# Patient Record
Sex: FEMALE | Race: WHITE | Employment: FULL TIME | URBAN - METROPOLITAN AREA
[De-identification: names, ages, dates, MRNs, and addresses within clinical notes are randomized per-mention and may not be internally consistent; named-entity substitution may affect disease eponyms.]

---

## 2017-11-09 ENCOUNTER — OFFICE VISIT (OUTPATIENT)
Dept: MEDICAL GROUP | Facility: MEDICAL CENTER | Age: 47
End: 2017-11-09
Payer: COMMERCIAL

## 2017-11-09 DIAGNOSIS — Z17.0 MALIGNANT NEOPLASM OF LEFT BREAST IN FEMALE, ESTROGEN RECEPTOR POSITIVE, UNSPECIFIED SITE OF BREAST (HCC): ICD-10-CM

## 2017-11-09 DIAGNOSIS — C50.912 MALIGNANT NEOPLASM OF LEFT BREAST IN FEMALE, ESTROGEN RECEPTOR POSITIVE, UNSPECIFIED SITE OF BREAST (HCC): ICD-10-CM

## 2017-11-09 DIAGNOSIS — M25.50 ARTHRALGIA, UNSPECIFIED JOINT: ICD-10-CM

## 2017-11-09 DIAGNOSIS — Z82.61 FAMILY HISTORY OF RHEUMATOID ARTHRITIS: ICD-10-CM

## 2017-11-09 PROBLEM — Z90.13 S/P MASTECTOMY, BILATERAL: Status: ACTIVE | Noted: 2017-10-16

## 2017-11-09 PROCEDURE — 99214 OFFICE O/P EST MOD 30 MIN: CPT | Performed by: FAMILY MEDICINE

## 2017-11-09 RX ORDER — TAMOXIFEN CITRATE 20 MG/1
20 TABLET ORAL 2 TIMES DAILY
COMMUNITY
End: 2018-12-20

## 2017-11-09 RX ORDER — GABAPENTIN 100 MG/1
100 CAPSULE ORAL 3 TIMES DAILY
COMMUNITY
End: 2018-07-11

## 2017-11-09 ASSESSMENT — PATIENT HEALTH QUESTIONNAIRE - PHQ9: CLINICAL INTERPRETATION OF PHQ2 SCORE: 0

## 2017-11-09 NOTE — ASSESSMENT & PLAN NOTE
The patient is being followed at [facility] for this. She is scheduled to see them next month. She currently has expanders in place and will be scheduled for reconstruction in the next several months. 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 LEFT 3:30, 5 cm FN: 1.8cm irregular spiculated mass with overlying skin retraction.  LEFT 3:30, 2.5 cm FN: 1.3cm irregular spiculated mass. LEFT inner retroareolar: Small benign appearing oval circumscribed masses measuring up to 1.3 cm. RIGHT inner retroareolar: Multiple oval circumscribed masses measuring up to 3cm.  -04/26/17: Bilateral US (converted from screening to diagnostic):  LEFT 3:30, 5cm FN: 1.8 x 1.1 x 0.9cm irregular spiculated hypoechoic mass, highly suspicious. LEFT 3:30, 2.5cm FN: 0.8 x 0.9 x 1.3cm irregular spiculated mass, highly suspicious. Few small cysts. No left axillary adenopathy. RIGHT 11:00, 4cm FN: 0.9 x 0.6 x 0.5cm oval mass with microlobulated margins (originally better seen on ABUS but confirmed on focused US). RIGHT inner retroareolar additional benign appearing oval circumscribed masses including cysts measuring up to 3cm.  Pathology:  LEFT 3:30, 5cm FN (1.8cm mass): IDC. Grade 2. No LVI. l0 mm on core. ER 61-70%. RI negative. Ki-6715-20%. Her-2 negative by FISH  HER2 negative by FISH.  LEFT 3:30, 2.5cm FN (1.3cm mass): IDC. Grade 3. No LVI. 15 mm on core. ER 41-50%. RI 1-10%. Ki-67 30-40%.  HER2 negative by FISH. +LCIS focally.  RIGHT 11:00. 4cm FN (0.9cm mass): Sclerosing adenosis, negative for CA. Felt to be concordant.  -05/13/17: MRI breast - LEFT posterior lateral mass measures 2.4 x 2.5 x 2.9cm, abuts but does not invade the pectoralis major. Extends to within 2mm of overlying skin which is abnormally thickened and retracted, though no abnormal skin enhancement. LEFT anterior-medial smaller mass measures 1.6 x 1.3x 1.5cm. Highly  suspicious lower outer (3:30-5:30) segmental nonmass enhancement inferior, medial and between both masses spanning a region 3.6 x 4.2 x 3cm. Few small inner left breast cysts. No axillary adenopathy. RIGHT breast with multiple cysts measuring up to 2.5cm in the outer breast and 3.lcm in the inner breast.  -05/28/17: Mammaprint - low  -05/30/17: CT CAP -  Enhancing mass in the left breast, no axillary, hilar, mammary LAD.  No distant mets  -6/1/17: MRI  LEFT Breast:  1) Index tumor: There are two adjacent irregular masses with spiculated margins in the lower outer left breast, posterior depth, best seen on post pete axial image 77/108 and sagittal 98/512, with a biopsy clip centered in each mass. Signal intensity/time curve demonstrates rapid initial rise and washout enhancement kinetics.     2) Additionally, there is asymmetric non-mass enhancement extending medially from the two index masses, concerning for DCIS. The overall span of the two adjacent masses and NME measures 34 x 32 x 28 mm.     3) There is a 5 mm oval mass in the outer central left breast, anterior depth, best seen on post pete axial image 66/108. Signal intensity/time curve demonstrates rapid initial rise and washout enhancement kinetics. This mass is approximately 2 cm anterosuperior to the index masses.     RIGHT Breast:  1) There are 2 adjacent foci in the upper outer right breast, anterior depth. One is 4mm and is best seen on post pete axial image 57/108. Signal intensity/time curve demonstrates rapid initial rise and plateau enhancement kinetics. The other is 3 mm and is best seen on post pete axial image 55/108. Signal intensity/time curve demonstrates medium initial rise and persistent enhancement kinetics.     2) There is a post biopsy clip in the outer central right breast, middle depth, site of biopsy proven sclerosing adenosis, which was sampled at an outside facility. There is non-mass enhancement just medial to the biopsy clip, best seen  on post pete axial image 69/108, which is likely represents the sclerosing adenosis.   6/2/17: start tamoxifen    6/9/17: start zoladex injection    7/2017: switch from tamoxifen to letrozole  8/2017: d/c letrozole and switch back to tamoxifen 2/2 depression  8/29/17:  bilateral mastectomies and left slnb: path pending

## 2017-11-10 NOTE — ASSESSMENT & PLAN NOTE
Patient mother has psoriatic arthritis and other family members have rheumatoid arthritis. Patient has diffuse joint pain that started prior to her cancer diagnoses. She has not been tested for autoimmune diseases like to proceed with that.

## 2017-11-10 NOTE — PROGRESS NOTES
Chief Complaint   Patient presents with   • Establish Care         Shira Guzman is a 47 y.o. female here to establish care and for evaluation and management of:        HPI:    Malignant neoplasm of left female breast (CMS-HCC)  The patient is being followed at [facility] for this. She is scheduled to see them next month. She currently has expanders in place and will be scheduled for reconstruction in the next several months. 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 LEFT 3:30, 5 cm FN: 1.8cm irregular spiculated mass with overlying skin retraction.  LEFT 3:30, 2.5 cm FN: 1.3cm irregular spiculated mass. LEFT inner retroareolar: Small benign appearing oval circumscribed masses measuring up to 1.3 cm. RIGHT inner retroareolar: Multiple oval circumscribed masses measuring up to 3cm.  -04/26/17: Bilateral US (converted from screening to diagnostic):  LEFT 3:30, 5cm FN: 1.8 x 1.1 x 0.9cm irregular spiculated hypoechoic mass, highly suspicious. LEFT 3:30, 2.5cm FN: 0.8 x 0.9 x 1.3cm irregular spiculated mass, highly suspicious. Few small cysts. No left axillary adenopathy. RIGHT 11:00, 4cm FN: 0.9 x 0.6 x 0.5cm oval mass with microlobulated margins (originally better seen on ABUS but confirmed on focused US). RIGHT inner retroareolar additional benign appearing oval circumscribed masses including cysts measuring up to 3cm.  Pathology:  LEFT 3:30, 5cm FN (1.8cm mass): IDC. Grade 2. No LVI. l0 mm on core. ER 61-70%. AK negative. Ki-6715-20%. Her-2 negative by FISH  HER2 negative by FISH.  LEFT 3:30, 2.5cm FN (1.3cm mass): IDC. Grade 3. No LVI. 15 mm on core. ER 41-50%. AK 1-10%. Ki-67 30-40%.  HER2 negative by FISH. +LCIS focally.  RIGHT 11:00. 4cm FN (0.9cm mass): Sclerosing adenosis, negative for CA. Felt to be concordant.  -05/13/17: MRI breast - LEFT posterior lateral mass measures 2.4 x 2.5 x 2.9cm, abuts but does  not invade the pectoralis major. Extends to within 2mm of overlying skin which is abnormally thickened and retracted, though no abnormal skin enhancement. LEFT anterior-medial smaller mass measures 1.6 x 1.3x 1.5cm. Highly suspicious lower outer (3:30-5:30) segmental nonmass enhancement inferior, medial and between both masses spanning a region 3.6 x 4.2 x 3cm. Few small inner left breast cysts. No axillary adenopathy. RIGHT breast with multiple cysts measuring up to 2.5cm in the outer breast and 3.lcm in the inner breast.  -05/28/17: Mammaprint - low  -05/30/17: CT CAP -  Enhancing mass in the left breast, no axillary, hilar, mammary LAD.  No distant mets  -6/1/17: MRI  LEFT Breast:  1) Index tumor: There are two adjacent irregular masses with spiculated margins in the lower outer left breast, posterior depth, best seen on post pete axial image 77/108 and sagittal 98/512, with a biopsy clip centered in each mass. Signal intensity/time curve demonstrates rapid initial rise and washout enhancement kinetics.     2) Additionally, there is asymmetric non-mass enhancement extending medially from the two index masses, concerning for DCIS. The overall span of the two adjacent masses and NME measures 34 x 32 x 28 mm.     3) There is a 5 mm oval mass in the outer central left breast, anterior depth, best seen on post pete axial image 66/108. Signal intensity/time curve demonstrates rapid initial rise and washout enhancement kinetics. This mass is approximately 2 cm anterosuperior to the index masses.     RIGHT Breast:  1) There are 2 adjacent foci in the upper outer right breast, anterior depth. One is 4mm and is best seen on post pete axial image 57/108. Signal intensity/time curve demonstrates rapid initial rise and plateau enhancement kinetics. The other is 3 mm and is best seen on post pete axial image 55/108. Signal intensity/time curve demonstrates medium initial rise and persistent enhancement kinetics.     2) There is a  post biopsy clip in the outer central right breast, middle depth, site of biopsy proven sclerosing adenosis, which was sampled at an outside facility. There is non-mass enhancement just medial to the biopsy clip, best seen on post pete axial image 69/108, which is likely represents the sclerosing adenosis.   6/2/17: start tamoxifen    6/9/17: start zoladex injection    7/2017: switch from tamoxifen to letrozole  8/2017: d/c letrozole and switch back to tamoxifen 2/2 depression  8/29/17:  bilateral mastectomies and left slnb: path pending    Joint pain  Patient mother has psoriatic arthritis and other family members have rheumatoid arthritis. Patient has diffuse joint pain that started prior to her cancer diagnoses. She has not been tested for autoimmune diseases like to proceed with that.      No Known Allergies    Current medicines (including changes today)  Current Outpatient Prescriptions   Medication Sig Dispense Refill   • tamoxifen (NOLVADEX) 20 MG tablet Take 20 mg by mouth 2 times a day.     • gabapentin (NEURONTIN) 100 MG Cap Take 100 mg by mouth 3 times a day.     • mupirocin (BACTROBAN) 2 % Ointment Apply bid to affected skin area(s) of infection 15 g 1   • methocarbamol (ROBAXIN) 750 MG Tab Take 1 Tab by mouth 3 times a day as needed. 60 Tab 3   • ibuprofen (MOTRIN) 800 MG TABS Take 1 Tab by mouth every 8 hours as needed. 30 Tab 3     No current facility-administered medications for this visit.      She  has a past medical history of Anxiety; Asthma; Cancer (CMS-HCC); and PCOS (polycystic ovarian syndrome).  She  has a past surgical history that includes ovarian cystectomy (@ 17 yo); knee manipulation (retracking of knee cap); and mastectomy modified radical.  Social History   Substance Use Topics   • Smoking status: Former Smoker     Packs/day: 0.05     Years: 1.00     Types: Cigarettes     Quit date: 1/3/2014   • Smokeless tobacco: Never Used      Comment: 2 cig / day   • Alcohol use No     Social  History     Social History Narrative   • No narrative on file     Family History   Problem Relation Age of Onset   • Arthritis Mother      psoriatic.   • Heart Disease Mother    • Diabetes Mother    • Genetic Mother      psoriatric arthritis   • Hypertension Father    • Hyperlipidemia Father      Family Status   Relation Status   • Mother Alive   • Father Alive   • Sister Alive         ROS  No fever or chills.  No nausea or vomiting.  No chest pain or palpitations.  No cough or SOB.  No pain with urination or hematuria.  No black or bloody stools.  All other systems reviewed and are negative     Objective:     There were no vitals taken for this visit. There is no height or weight on file to calculate BMI.  Physical Exam:      Well developed, well nourished.  Alert, oriented in no acute distress.  Psych: Eye contact is good, speech goal directed, affect calm  Eyes: conjunctiva non-injected, sclera non-icteric.  Neck Supple.  No adenopathy or masses in the neck or supraclavicular regions. No thyromegaly  Lungs: clear to auscultation bilaterally with good excursion. No wheezes or rhonchi  CV: regular rate and rhythm. No murmur      Assessment and Plan:   The following treatment plan was discussed    1. Malignant neoplasm of left breast in female, estrogen receptor positive, unspecified site of breast (CMS-HCC)  Continue therapy with [facility]. Will continue to monitor and coordinate as needed    2. Arthralgia, unspecified joint  Screening panel for rheumatoid and autoimmune disorders  - SULEIMAN ANTIBODY WITH REFLEX; Future  - RHEUMATOID ARTHRITIS FACTOR; Future  - CBC WITH DIFFERENTIAL; Future  - WESTERGREN SED RATE; Future    3. Family history of rheumatoid arthritis  See #2  - SULEIMAN ANTIBODY WITH REFLEX; Future  - RHEUMATOID ARTHRITIS FACTOR; Future  - CBC WITH DIFFERENTIAL; Future  - WESTERGREN SED RATE; Future      Records requested.    Any change or worsening of signs or symptoms, patient encouraged to follow-up or report  to the emergency room for further evaluation. Patient understands and agrees.    Followup: Return if symptoms worsen or fail to improve.

## 2018-05-29 ENCOUNTER — OFFICE VISIT (OUTPATIENT)
Dept: MEDICAL GROUP | Facility: MEDICAL CENTER | Age: 48
End: 2018-05-29
Payer: COMMERCIAL

## 2018-05-29 VITALS
DIASTOLIC BLOOD PRESSURE: 64 MMHG | SYSTOLIC BLOOD PRESSURE: 104 MMHG | HEIGHT: 64 IN | TEMPERATURE: 98.5 F | WEIGHT: 127 LBS | BODY MASS INDEX: 21.68 KG/M2 | RESPIRATION RATE: 20 BRPM | OXYGEN SATURATION: 95 % | HEART RATE: 88 BPM

## 2018-05-29 DIAGNOSIS — F32.1 CURRENT MODERATE EPISODE OF MAJOR DEPRESSIVE DISORDER WITHOUT PRIOR EPISODE (HCC): ICD-10-CM

## 2018-05-29 DIAGNOSIS — Z17.0 MALIGNANT NEOPLASM OF LEFT BREAST IN FEMALE, ESTROGEN RECEPTOR POSITIVE, UNSPECIFIED SITE OF BREAST (HCC): ICD-10-CM

## 2018-05-29 DIAGNOSIS — N94.6 DYSMENORRHEA: ICD-10-CM

## 2018-05-29 DIAGNOSIS — C50.912 MALIGNANT NEOPLASM OF LEFT BREAST IN FEMALE, ESTROGEN RECEPTOR POSITIVE, UNSPECIFIED SITE OF BREAST (HCC): ICD-10-CM

## 2018-05-29 PROCEDURE — 99214 OFFICE O/P EST MOD 30 MIN: CPT | Performed by: FAMILY MEDICINE

## 2018-05-29 RX ORDER — IBUPROFEN 800 MG/1
800 TABLET ORAL EVERY 8 HOURS PRN
Qty: 90 TAB | Refills: 3 | Status: SHIPPED | OUTPATIENT
Start: 2018-05-29 | End: 2019-06-13 | Stop reason: SDUPTHER

## 2018-05-29 RX ORDER — BUPROPION HYDROCHLORIDE 150 MG/1
150 TABLET ORAL EVERY MORNING
Qty: 30 TAB | Refills: 3 | Status: SHIPPED | OUTPATIENT
Start: 2018-05-29 | End: 2018-07-11

## 2018-05-29 RX ORDER — BUPROPION HYDROCHLORIDE 100 MG/1
100 TABLET ORAL 2 TIMES DAILY
COMMUNITY
End: 2018-05-29

## 2018-05-30 ENCOUNTER — PATIENT MESSAGE (OUTPATIENT)
Dept: MEDICAL GROUP | Facility: MEDICAL CENTER | Age: 48
End: 2018-05-30

## 2018-05-30 NOTE — ASSESSMENT & PLAN NOTE
[facility] doctor started her Wellbutrin 100 mg BID when she developed severe depression after starting Tamoxifen for her breast cancer.  She reports she has episodes where she can't stop crying and finds it difficult to get out of bed some days.  Her daughter is graduating from high school in 2 weeks and going to [employer] for college.     Patient denies SI/HI.  Depression Screen (PHQ-2/PHQ-9) 6/3/2015 10/1/2015 11/9/2017   PHQ-2 Total Score 6 - -   PHQ-2 Total Score - 0 -   PHQ-2 Total Score - - 0   PHQ-9 Total Score 21 - -

## 2018-06-01 NOTE — PROGRESS NOTES
Subjective:     Chief Complaint   Patient presents with   • Referral Needed     Therapist who treats cancer pt   • Follow-Up     labs & medication       Shira Guzman is a 47 y.o. female here today for evaluation and management of:    Current moderate episode of major depressive disorder without prior episode ([facility])  [facility] doctor started her Wellbutrin 100 mg BID when she developed severe depression after starting Tamoxifen for her breast cancer.  She reports she has episodes where she can't stop crying and finds it difficult to get out of bed some days.  Her daughter is graduating from high school in 2 weeks and going to [employer] for college.     Patient denies SI/HI.  Depression Screen (PHQ-2/PHQ-9) 6/3/2015 10/1/2015 11/9/2017   PHQ-2 Total Score 6 - -   PHQ-2 Total Score - 0 -   PHQ-2 Total Score - - 0   PHQ-9 Total Score 21 - -              No Known Allergies    Current medicines (including changes today)  Current Outpatient Prescriptions   Medication Sig Dispense Refill   • buPROPion (WELLBUTRIN XL) 150 MG XL tablet Take 1 Tab by mouth every morning. 30 Tab 3   • ibuprofen (MOTRIN) 800 MG Tab Take 1 Tab by mouth every 8 hours as needed. 90 Tab 3   • tamoxifen (NOLVADEX) 20 MG tablet Take 20 mg by mouth 2 times a day.     • gabapentin (NEURONTIN) 100 MG Cap Take 100 mg by mouth 3 times a day.       No current facility-administered medications for this visit.        She  has a past medical history of Anxiety; Asthma; Cancer ([facility]); and PCOS (polycystic ovarian syndrome).    Patient Active Problem List    Diagnosis Date Noted   • Current moderate episode of major depressive disorder without prior episode ([facility]) 05/29/2018   • Joint pain 11/09/2017   • Family history of rheumatoid arthritis 11/09/2017   • S/P mastectomy, bilateral 10/16/2017   • Malignant neoplasm of left female breast (HCC) 06/01/2017   • Cyst of left breast 11/05/2015   • Neck pain 10/01/2015   • Breast lump 10/01/2015   • Generalized anxiety  "disorder 06/03/2015   • PCOS (polycystic ovarian syndrome)        ROS   No fever or chills.  No nausea or vomiting.  No chest pain or palpitations.  No cough or SOB.  No pain with urination or hematuria.  No black or bloody stools.       Objective:     Blood pressure 104/64, pulse 88, temperature 36.9 °C (98.5 °F), resp. rate 20, height 1.626 m (5' 4\"), weight 57.6 kg (127 lb), SpO2 95 %. Body mass index is 21.8 kg/m².   Physical Exam:  Well developed, well nourished.  Alert, oriented in no acute distress.  Eye contact is good, speech goal directed, affect tearful  Eyes: conjunctiva non-injected, sclera non-icteric.  Neck Supple.  No adenopathy or masses in the neck or supraclavicular regions. No thyromegaly  Lungs: clear to auscultation bilaterally with good excursion. No wheezes or rhonchi  CV: regular rate and rhythm. No murmur        Assessment and Plan:   The following treatment plan was discussed    1. Malignant neoplasm of left breast in female, estrogen receptor positive, unspecified site of breast (HCC)  Discuss with patient switching to Wellbutrin  mg daily if okay with oncology.  Refer to psychology  - REFERRAL TO PSYCHOLOGY    2. Current moderate episode of major depressive disorder without prior episode (HCC)  See above  - buPROPion (WELLBUTRIN XL) 150 MG XL tablet; Take 1 Tab by mouth every morning.  Dispense: 30 Tab; Refill: 3  - REFERRAL TO PSYCHOLOGY    3. Dysmenorrhea  Refill ibuprofen  - ibuprofen (MOTRIN) 800 MG Tab; Take 1 Tab by mouth every 8 hours as needed.  Dispense: 90 Tab; Refill: 3    Any change or worsening of signs or symptoms, patient encouraged to follow-up or report to the emergency room for further evaluation. Patient understands and agrees.    Followup: Return in about 6 months (around 11/29/2018).           "

## 2018-07-11 ENCOUNTER — OFFICE VISIT (OUTPATIENT)
Dept: MEDICAL GROUP | Facility: MEDICAL CENTER | Age: 48
End: 2018-07-11
Payer: COMMERCIAL

## 2018-07-11 VITALS
SYSTOLIC BLOOD PRESSURE: 114 MMHG | DIASTOLIC BLOOD PRESSURE: 74 MMHG | RESPIRATION RATE: 20 BRPM | WEIGHT: 130 LBS | TEMPERATURE: 97.9 F | BODY MASS INDEX: 21.66 KG/M2 | HEART RATE: 99 BPM | HEIGHT: 65 IN | OXYGEN SATURATION: 97 %

## 2018-07-11 DIAGNOSIS — F32.1 CURRENT MODERATE EPISODE OF MAJOR DEPRESSIVE DISORDER WITHOUT PRIOR EPISODE (HCC): ICD-10-CM

## 2018-07-11 DIAGNOSIS — S16.1XXA CERVICAL MYOFASCIAL STRAIN, INITIAL ENCOUNTER: ICD-10-CM

## 2018-07-11 PROCEDURE — 99214 OFFICE O/P EST MOD 30 MIN: CPT | Performed by: FAMILY MEDICINE

## 2018-07-11 RX ORDER — CITALOPRAM HYDROBROMIDE 10 MG/1
10 TABLET ORAL DAILY
COMMUNITY
End: 2018-09-05
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
## 2018-07-11 NOTE — ASSESSMENT & PLAN NOTE
Recently weaned off Wellbutrin because it inhibits the Arimidex.  Switched to citalopram (only escitalopram, citalopram, and effexor are non-inhibitors)  Mood is improved with current medication and therapy.    Patient denies SI/HI.  Depression Screen (PHQ-2/PHQ-9) 6/3/2015 10/1/2015 11/9/2017   PHQ-2 Total Score 6 - -   PHQ-2 Total Score - 0 -   PHQ-2 Total Score - - 0   PHQ-9 Total Score 21 - -     Hasn't seen psychologist yet.  Took Effexor 20 years ago and she didn't think it worked  Lexapro decreased her libido and she felt flat
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
## 2018-07-19 ENCOUNTER — PATIENT MESSAGE (OUTPATIENT)
Dept: MEDICAL GROUP | Facility: MEDICAL CENTER | Age: 48
End: 2018-07-19

## 2018-07-19 NOTE — TELEPHONE ENCOUNTER
From: Shira Guzman  To: Guera Montanez M.D.  Sent: 7/19/2018 2:12 PM PDT  Subject: Prescription Question    Hello,  I started to feel extremely agitated and no limbedo at all on the few days of celexa at 10mg. Should I continue with it and how long of a breakin period can I expect? If things do not get better is your recommendation the up mgs? I am just really agitated -ralph irritable. (according to others)    Please advise  Shira

## 2018-07-20 ENCOUNTER — APPOINTMENT (OUTPATIENT)
Dept: PHYSICAL THERAPY | Facility: REHABILITATION | Age: 48
End: 2018-07-20
Attending: FAMILY MEDICINE
Payer: COMMERCIAL

## 2018-07-24 ENCOUNTER — APPOINTMENT (OUTPATIENT)
Dept: PHYSICAL THERAPY | Facility: REHABILITATION | Age: 48
End: 2018-07-24
Attending: FAMILY MEDICINE
Payer: COMMERCIAL

## 2018-07-26 ENCOUNTER — APPOINTMENT (OUTPATIENT)
Dept: PHYSICAL THERAPY | Facility: REHABILITATION | Age: 48
End: 2018-07-26
Attending: FAMILY MEDICINE
Payer: COMMERCIAL

## 2018-07-27 ENCOUNTER — PHYSICAL THERAPY (OUTPATIENT)
Dept: PHYSICAL THERAPY | Facility: REHABILITATION | Age: 48
End: 2018-07-27
Attending: FAMILY MEDICINE
Payer: COMMERCIAL

## 2018-07-27 DIAGNOSIS — S16.1XXD ACUTE CERVICAL MYOFASCIAL STRAIN, SUBSEQUENT ENCOUNTER: ICD-10-CM

## 2018-07-27 PROCEDURE — 97140 MANUAL THERAPY 1/> REGIONS: CPT

## 2018-07-27 PROCEDURE — 97110 THERAPEUTIC EXERCISES: CPT

## 2018-07-27 PROCEDURE — 97162 PT EVAL MOD COMPLEX 30 MIN: CPT

## 2018-07-27 ASSESSMENT — ENCOUNTER SYMPTOMS
QUALITY: PRESSURE
PAIN TIMING: CONTINUOUSLY
QUALITY: TIGHTNESS
PAIN SCALE AT HIGHEST: 8
PAIN SCALE AT LOWEST: 4
PAIN SCALE: 5
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
## 2018-07-31 ENCOUNTER — PHYSICAL THERAPY (OUTPATIENT)
Dept: PHYSICAL THERAPY | Facility: REHABILITATION | Age: 48
End: 2018-07-31
Attending: FAMILY MEDICINE
Payer: COMMERCIAL

## 2018-07-31 DIAGNOSIS — S16.1XXD ACUTE CERVICAL MYOFASCIAL STRAIN, SUBSEQUENT ENCOUNTER: ICD-10-CM

## 2018-07-31 PROCEDURE — 97110 THERAPEUTIC EXERCISES: CPT

## 2018-07-31 PROCEDURE — 97140 MANUAL THERAPY 1/> REGIONS: CPT

## 2018-07-31 PROCEDURE — 97014 ELECTRIC STIMULATION THERAPY: CPT

## 2018-07-31 NOTE — OP THERAPY DAILY TREATMENT
Outpatient Physical Therapy  DAILY TREATMENT     [facility] [address] [facility], [address]  [place] [address]  Phone:  [phone]    Date: 07/31/2018    Patient: Shira Guzman  YOB: 1970  MRN: [id number]     Time Calculation  Start time: 0930  Stop time: 1015 Time Calculation (min): 45 minutes     Chief Complaint: No chief complaint on file.    Visit #: 2    SUBJECTIVE:  Increased soreness in muscles of cervical spine after last session. Was not able to raise arms above head due to irritation of pectorals and breast tissue.     OBJECTIVE:  Current objective measures:   Pre tx  R. Cervical rotation 45 deg; L. Rotation 60    Post tx  R. Cervical rotation 70 deg; L. Rotation 70 deg. Complaint of tightness at R. Lower cervical preventing further motion          Therapeutic Exercises (CPT 99844):     1. Sh. high rows, green x 20    2. Cervical R. rotation w/ arm supported 90-90, 30    Therapeutic Treatments and Modalities:     1. Manual Therapy (CPT 92032), shoulder and cervical    2. E Stim Unattended (CPT 24654), cervical, mhp and IFC 15 min    Therapeutic Treatment and Modalities Summary: Manual assessment; cupping upper trap, levator scap, cervical paraspinals bilateral.     Time-based treatments/modalities:  Manual therapy minutes (CPT 84118): 20 minutes  Therapeutic exercise minutes (CPT 25594): 10 minutes         ASSESSMENT:   Response to treatment: Tissue restricting cervical right rotation is predominantly shoulder muscles. Trial of cupping neck and shoulders performed today, patient reported tolerating well and cervical range of motion was improved post tx.     PLAN/RECOMMENDATIONS:   Plan for treatment: therapy treatment to continue next visit.  Planned interventions for next visit: continue with current treatment.

## 2018-08-02 ENCOUNTER — PHYSICAL THERAPY (OUTPATIENT)
Dept: PHYSICAL THERAPY | Facility: REHABILITATION | Age: 48
End: 2018-08-02
Attending: FAMILY MEDICINE
Payer: COMMERCIAL

## 2018-08-02 DIAGNOSIS — S16.1XXD ACUTE CERVICAL MYOFASCIAL STRAIN, SUBSEQUENT ENCOUNTER: ICD-10-CM

## 2018-08-02 PROCEDURE — 97014 ELECTRIC STIMULATION THERAPY: CPT

## 2018-08-02 PROCEDURE — 97140 MANUAL THERAPY 1/> REGIONS: CPT

## 2018-08-02 NOTE — OP THERAPY DAILY TREATMENT
Outpatient Physical Therapy  DAILY TREATMENT     [facility] Therapy [address] [facility], [address]  [place] [address]  Phone:  [phone]    Date: 08/02/2018    Patient: Shira Guzman  YOB: 1970  MRN: [id number]     Time Calculation  Start time: 1030  Stop time: 1115 Time Calculation (min): 45 minutes     Chief Complaint: No chief complaint on file.    Visit #: 3    SUBJECTIVE:  I think last PT session helped, reduced tension in neck. Still getting head aches, but less frequently.     OBJECTIVE:  Current objective measures:     R. Shoulder bonnie-scapular muscles, especially middle trapezius is TTP. L. Cervical paraspinals hypertonicity and mildly TTP.   Hypertonicity sub-occilpital space.     Cervical AROM  Rotation R. 75 deg.   Rotation L. 80 deg.       Post tx  Rotation R. 80 deg  Rotation L. 80 deg.           Therapeutic Treatments and Modalities:     1. Manual Therapy (CPT 28044), cervical    2. E Stim Unattended (CPT 11614), cervical, IFC and MHP    Therapeutic Treatment and Modalities Summary: STW shoulder R. Cervical bilateral; mobilization R. Scapulothoracic joint;     Time-based treatments/modalities:  Manual therapy minutes (CPT 03113): 30 minutes       ASSESSMENT:   Response to treatment: Reduction in myofascial restriction in shoulder and neck post tx that resulted in improved quality and quantity of cervical rotation.     PLAN/RECOMMENDATIONS:   Plan for treatment: therapy treatment to continue next visit.  Planned interventions for next visit: continue with current treatment.

## 2018-08-06 ENCOUNTER — PHYSICAL THERAPY (OUTPATIENT)
Dept: PHYSICAL THERAPY | Facility: REHABILITATION | Age: 48
End: 2018-08-06
Attending: FAMILY MEDICINE
Payer: COMMERCIAL

## 2018-08-06 DIAGNOSIS — S16.1XXD ACUTE CERVICAL MYOFASCIAL STRAIN, SUBSEQUENT ENCOUNTER: ICD-10-CM

## 2018-08-06 PROCEDURE — 97014 ELECTRIC STIMULATION THERAPY: CPT

## 2018-08-06 PROCEDURE — 97140 MANUAL THERAPY 1/> REGIONS: CPT

## 2018-08-06 NOTE — OP THERAPY DAILY TREATMENT
Outpatient Physical Therapy  DAILY TREATMENT     [facility] Outpatient Physical Therapy [address], [address]  [place] [address]  Phone:  [phone]    Date: 08/06/2018    Patient: Shira Guzman  YOB: 1970  MRN: [id number]     Time Calculation  Start time: 0800  Stop time: 0845 Time Calculation (min): 45 minutes     Chief Complaint: Neck Problem; Neck Pain; and Shoulder Problem    Visit #: 4    SUBJECTIVE:  Reduced neck tension, improved ability to turn head. Experienced a headache this weekend. Feels like there is a constant tightness on R. Side of neck, but that conditions improving.     OBJECTIVE:  Current objective measures:     Pre tx    Cervical rotation R. Most restricted, 60 deg. AROM  L. Rotation 75 deg. AROM    Post tx  Good reduction in scapular mobility and reduction myofascial restriction in bonnie-scapular and upper thoracic paraspinals.  Continued tension upper cervical    R. Rotation 75 deg.   L. Rotation 80 deg.           Therapeutic Exercises (CPT 33560):     1. OA flexion w/ balls at joint line    Therapeutic Treatments and Modalities:     1. Manual Therapy (CPT 76555), cervicothoracic    2. E Stim Unattended (CPT 56536), cervicothoracic, IFC and MHP    Therapeutic Treatment and Modalities Summary: Cupping cervical, thoracic, and bonnie-scapular; Manual assessment; myofascial release at sub-occipitals and CT junction    Time-based treatments/modalities:  Manual therapy minutes (CPT 31927): 30 minutes  Functional training, self care minutes (CPT 97627): 5 minutes         ASSESSMENT:   Response to treatment: Reduction in thoracic and scapular myofascial restriction which allowed for improving shoulder and cervical range of motion. Tolerated treatment well.     PLAN/RECOMMENDATIONS:   Plan for treatment: therapy treatment to continue next visit.  Planned interventions for next visit: continue with current treatment.

## 2018-08-08 ENCOUNTER — PHYSICAL THERAPY (OUTPATIENT)
Dept: PHYSICAL THERAPY | Facility: REHABILITATION | Age: 48
End: 2018-08-08
Attending: FAMILY MEDICINE
Payer: COMMERCIAL

## 2018-08-08 DIAGNOSIS — S16.1XXD ACUTE CERVICAL MYOFASCIAL STRAIN, SUBSEQUENT ENCOUNTER: ICD-10-CM

## 2018-08-08 PROCEDURE — 97140 MANUAL THERAPY 1/> REGIONS: CPT

## 2018-08-08 PROCEDURE — 97014 ELECTRIC STIMULATION THERAPY: CPT

## 2018-08-08 NOTE — OP THERAPY DAILY TREATMENT
Outpatient Physical Therapy  DAILY TREATMENT     [facility] [place] [address] [address] [facility], [address]  [facility] [address]  Phone:  [phone]    Date: 08/08/2018    Patient: Shira Guzman  YOB: 1970  MRN: [id number]     Time Calculation  Start time: 0812  Stop time: 0900 Time Calculation (min): 48 minutes     Chief Complaint: No chief complaint on file.    Visit #: 5    SUBJECTIVE:  Reduced neck tension, improved ability to turn head. Increased sense of pressure in back of neck after cupping, by next AM it is resolved and feels like neck sx's reduced.   OBJECTIVE:  Current objective measures:     Pre tx    Cervical rotation R. Most restricted, 60 deg. AROM  L. Rotation 75 deg. AROM    Post tx  Good reduction in scapular mobility and reduction myofascial restriction in bonnie-scapular and upper thoracic paraspinals.  Continued tension upper cervical    R. Rotation 75 deg.   L. Rotation 80 deg.           Therapeutic Exercises (CPT 44067):     1. OA flexion w/ balls at joint line    Therapeutic Treatments and Modalities:     1. Manual Therapy (CPT 89567), cervicothoracic    2. E Stim Unattended (CPT 94553), cervicothoracic, IFC and MHP    Therapeutic Treatment and Modalities Summary: thoracic, and bonnie-scapular; Manual assessment; myofascial release at sub-occipitals and CT junction    Time-based treatments/modalities:  Manual therapy minutes (CPT 62991): 25 minutes         ASSESSMENT:   Response to treatment: Reduction in thoracic and scapular myofascial restriction which allowed for improving shoulder and cervical range of motion. Tolerated treatment well.     PLAN/RECOMMENDATIONS:   Plan for treatment: therapy treatment to continue next visit.  Planned interventions for next visit: continue with current treatment.    Outpatient Physical Therapy  DAILY TREATMENT     [facility] [place] [address] [facility], [address]  [facility] [address]  Phone:  [phone]    Date:  08/08/2018    Patient: Shira Guzman  YOB: 1970  MRN: [id number]     Time Calculation  Start time: 0812  Stop time: 0900 Time Calculation (min): 48 minutes     Chief Complaint: No chief complaint on file.    Visit #: 5    SUBJECTIVE:  Increased pressure in neck after cupping, it faded quicker than previous session. By the next AM no longer having these symptoms.     OBJECTIVE:  Current objective measures:           Therapeutic Exercises (CPT 79246):     1. OA flexion w/ balls at joint line    Therapeutic Treatments and Modalities:     1. Manual Therapy (CPT 20642), cervicothoracic    2. E Stim Unattended (CPT 20467), cervicothoracic, IFC and MHP    Therapeutic Treatment and Modalities Summary: Cupping cervical, thoracic, and bonnie-scapular; Manual assessment; myofascial release at sub-occipitals and CT junction    Time-based treatments/modalities:  Manual therapy minutes (CPT 11746): 25 minutes           ASSESSMENT:   Response to treatment: Reduction in thoracic and scapular myofascial restriction which allowed for improving shoulder and cervical range of motion. Tolerated treatment well.       PLAN/RECOMMENDATIONS:   Plan for treatment: therapy treatment to continue next visit.  Planned interventions for next visit: continue with current treatment.

## 2018-08-13 ENCOUNTER — PHYSICAL THERAPY (OUTPATIENT)
Dept: PHYSICAL THERAPY | Facility: REHABILITATION | Age: 48
End: 2018-08-13
Attending: FAMILY MEDICINE
Payer: COMMERCIAL

## 2018-08-13 DIAGNOSIS — S16.1XXD ACUTE CERVICAL MYOFASCIAL STRAIN, SUBSEQUENT ENCOUNTER: ICD-10-CM

## 2018-08-13 PROCEDURE — 97140 MANUAL THERAPY 1/> REGIONS: CPT

## 2018-08-13 PROCEDURE — 97014 ELECTRIC STIMULATION THERAPY: CPT

## 2018-08-13 NOTE — OP THERAPY DAILY TREATMENT
Outpatient Physical Therapy  DAILY TREATMENT     [facility] Therapy [address], [address]  [place] [address]  Phone:  [phone]    Date: 08/13/2018    Patient: Shira Guzman  YOB: 1970  MRN: [id number]     Time Calculation  Start time: 0732  Stop time: 0815 Time Calculation (min): 43 minutes     Chief Complaint: No chief complaint on file.    Visit #: 6    SUBJECTIVE:  Busy weekend. Neck is feeling tight at right upper neck. Headaches not coming on very often anymore.     OBJECTIVE:  Current objective measures:     A/PROM  Initial: cervical R. Rotation 45 deg.     Post manual  A/PROM  R. Cervical rotation 70 deg.     End feel is muscular, no pain at end range passive R. Rotation.     Increased left myofascial tension      Therapeutic Treatments and Modalities:     1. Manual Therapy (CPT 78135), cervical and shoulder    Time-based treatments/modalities:  Manual therapy minutes (CPT 75764): 25 minutes       ASSESSMENT:   Response to treatment: Neck mobility is predominently limited by muscular tension in neck. Achieving reduction of myofascial restriction and improved cervical mobility within session but it is returning between visits.     PLAN/RECOMMENDATIONS:   Plan for treatment: therapy treatment to continue next visit.  Planned interventions for next visit: continue with current treatment.

## 2018-08-15 ENCOUNTER — PHYSICAL THERAPY (OUTPATIENT)
Dept: PHYSICAL THERAPY | Facility: REHABILITATION | Age: 48
End: 2018-08-15
Attending: FAMILY MEDICINE
Payer: COMMERCIAL

## 2018-08-15 DIAGNOSIS — S16.1XXD ACUTE CERVICAL MYOFASCIAL STRAIN, SUBSEQUENT ENCOUNTER: ICD-10-CM

## 2018-08-15 PROCEDURE — 97140 MANUAL THERAPY 1/> REGIONS: CPT

## 2018-08-15 PROCEDURE — 97014 ELECTRIC STIMULATION THERAPY: CPT

## 2018-08-15 NOTE — OP THERAPY DAILY TREATMENT
Outpatient Physical Therapy  DAILY TREATMENT     [facility] [address], [address]  [place] [address]  Phone:  [phone]    Date: 08/15/2018    Patient: Shira Guzman  YOB: 1970  MRN: [id number]     Time Calculation  Start time: 0730  Stop time: 0815 Time Calculation (min): 45 minutes     Chief Complaint: No chief complaint on file.    Visit #: 7    SUBJECTIVE:  Feeling stiff this AM, slept in one position all night.     OBJECTIVE:  Current objective measures:     Hypertonic R. Cervical paraspinals  Slight loss of rotation bilateral w/ complaint of neck tension at end range.             Therapeutic Treatments and Modalities:     1. Manual Therapy (CPT 36358), cervical and R. shoulder    2. E Stim Unattended (CPT 48989), cervical    Time-based treatments/modalities:  Manual therapy minutes (CPT 75497): 30 minutes         ASSESSMENT:   Response to treatment: Reduction in cervical tone and improved mobility post tx.     PLAN/RECOMMENDATIONS:   Plan for treatment: therapy treatment to continue next visit.  Planned interventions for next visit: continue with current treatment.

## 2018-08-20 ENCOUNTER — PHYSICAL THERAPY (OUTPATIENT)
Dept: PHYSICAL THERAPY | Facility: REHABILITATION | Age: 48
End: 2018-08-20
Attending: FAMILY MEDICINE
Payer: COMMERCIAL

## 2018-08-20 DIAGNOSIS — S16.1XXD ACUTE CERVICAL MYOFASCIAL STRAIN, SUBSEQUENT ENCOUNTER: ICD-10-CM

## 2018-08-20 PROCEDURE — 97110 THERAPEUTIC EXERCISES: CPT

## 2018-08-20 PROCEDURE — 97140 MANUAL THERAPY 1/> REGIONS: CPT

## 2018-08-20 NOTE — OP THERAPY DAILY TREATMENT
Outpatient Physical Therapy  DAILY TREATMENT     [facility] Outpatient Physical Therapy [address], [address]  [place] [address]  Phone:  [phone]    Date: 08/20/2018    Patient: Shira Guzman  YOB: 1970  MRN: [id number]     Time Calculation  Start time: 0732  Stop time: 0800 Time Calculation (min): 28 minutes     Chief Complaint: Neck Pain    Visit #: 8    SUBJECTIVE:  Neck stiffer in AM, more symptomatic. Had a massage this weekend, relieved shoulder tension. Headache on Saturday, R. Side of orbit, took robuxin (NSAID/mm relaxer), uncertain how much it's helping. Continued tension neck.     OBJECTIVE:  Current objective measures:     Cervical rotation symmetrical L to R.     TTP C4 and C6 articular pillar L and R.     Reduction in cervical myofascial tone.       Therapeutic Exercises (CPT 48668):     1. Quadraped arm raise, 2 x 10B, cervical retraction    2. Cervical retraction against ball, 20 purple baloon    3. Lat pull down, purple 20B    4. Thoracic scoops , next visit    Therapeutic Treatments and Modalities:     1. Manual Therapy (CPT 66924), cervical    Therapeutic Treatment and Modalities Summary: Manual assessment of tone and mobility    Time-based treatments/modalities:  Manual therapy minutes (CPT 28267): 8 minutes  Therapeutic exercise minutes (CPT 47077): 20 minutes         ASSESSMENT:   Response to treatment: Cervical range of motion has been improving with reduction in shoulder and neck tone. Her cervical spine had heightened sensitivity to palpation of facet joint multiple levels both L. And R. Given cervical stabilization exercise, improve pectoral length and and strength. Tolerated tx well.     PLAN/RECOMMENDATIONS:   Plan for treatment: therapy treatment to continue next visit.  Planned interventions for next visit: continue with current treatment.

## 2018-08-22 ENCOUNTER — PHYSICAL THERAPY (OUTPATIENT)
Dept: PHYSICAL THERAPY | Facility: REHABILITATION | Age: 48
End: 2018-08-22
Attending: FAMILY MEDICINE
Payer: COMMERCIAL

## 2018-08-22 DIAGNOSIS — S16.1XXD ACUTE CERVICAL MYOFASCIAL STRAIN, SUBSEQUENT ENCOUNTER: ICD-10-CM

## 2018-08-22 PROCEDURE — 97110 THERAPEUTIC EXERCISES: CPT

## 2018-08-22 PROCEDURE — 97140 MANUAL THERAPY 1/> REGIONS: CPT

## 2018-08-22 PROCEDURE — 97014 ELECTRIC STIMULATION THERAPY: CPT

## 2018-08-22 NOTE — OP THERAPY DAILY TREATMENT
Outpatient Physical Therapy  DAILY TREATMENT     [facility] Outpatient Physical Therapy [address] [facility], [address]  [place] [address]  Phone:  [phone]    Date: 08/22/2018    Patient: Shira Guzman  YOB: 1970  MRN: [id number]     Time Calculation  Start time: 0730  Stop time: 0815 Time Calculation (min): 45 minutes     Chief Complaint: No chief complaint on file.    Visit #: 9    SUBJECTIVE:  Headaches are less. Tension in neck noticed the most when turning head to it's end range of motion. Overall rating symptoms as an average 3-5/10.       OBJECTIVE:  Current objective measures:   Cervical rotation 70 deg. L and R. With no pain at end range  Side glide R. C4 hypomobility and tenderness  Reduction in tension around STJ and improved STJ mobility          Therapeutic Exercises (CPT 35119):     1. Quadraped arm raise, 2 x 10B, cervical retraction    2. Cervical retraction against ball, 20 purple baloon    3. Lat pull down, purple 20B    4. Thoracic scoops , 15    Therapeutic Treatments and Modalities:     1. Manual Therapy (CPT 05276), cervical    Therapeutic Treatment and Modalities Summary: Manual assessment of tone and mobility    Time-based treatments/modalities:  Manual therapy minutes (CPT 22135): 15 minutes  Therapeutic exercise minutes (CPT 14923): 15 minutes         ASSESSMENT:   Response to treatment: Condition improving, responded well to stabilization exercise reporting a reduction in neck tension the following day.     PLAN/RECOMMENDATIONS:   Plan for treatment: therapy treatment to continue next visit.  Planned interventions for next visit: continue with current treatment.

## 2018-08-27 ENCOUNTER — APPOINTMENT (OUTPATIENT)
Dept: PHYSICAL THERAPY | Facility: REHABILITATION | Age: 48
End: 2018-08-27
Attending: FAMILY MEDICINE
Payer: COMMERCIAL

## 2018-08-29 ENCOUNTER — PHYSICAL THERAPY (OUTPATIENT)
Dept: PHYSICAL THERAPY | Facility: REHABILITATION | Age: 48
End: 2018-08-29
Attending: FAMILY MEDICINE
Payer: COMMERCIAL

## 2018-08-29 DIAGNOSIS — S16.1XXD ACUTE CERVICAL MYOFASCIAL STRAIN, SUBSEQUENT ENCOUNTER: ICD-10-CM

## 2018-08-29 PROCEDURE — 97110 THERAPEUTIC EXERCISES: CPT

## 2018-08-29 PROCEDURE — 97014 ELECTRIC STIMULATION THERAPY: CPT

## 2018-08-29 PROCEDURE — 97140 MANUAL THERAPY 1/> REGIONS: CPT

## 2018-08-29 NOTE — OP THERAPY DAILY TREATMENT
Outpatient Physical Therapy  DAILY TREATMENT     [facility] Outpatient Physical Therapy [address], [address]  [place] [address]  Phone:  [phone]    Date: 08/29/2018    Patient: Shira Guzman  YOB: 1970  MRN: [id number]     Time Calculation  Start time: 0730  Stop time: 0800 Time Calculation (min): 30 minutes     Chief Complaint: No chief complaint on file.    Visit #: 10    SUBJECTIVE:  NO headaches past week. Neck tension has not been bad. Only took naproxen once this past week for neck tension.     OBJECTIVE:  Current objective measures:     Cervical Rotation R 60 deg. L. 60 deg. And improves to 90 deg. Post tx.     Cervical flexion WNL  Cs extension restricted, pain R. Cervical spine  Post R. > L  Quadrant restriction     Decreased myofascial tone at sub-occipital space.             Therapeutic Treatments and Modalities:     1. Manual Therapy (CPT 91533), cervical    Therapeutic Treatment and Modalities Summary: R. Facet upglide mobilization w/ STW cervical paraspinals and scalenes.      Time-based treatments/modalities:  Manual therapy minutes (CPT 03036): 20 minutes  Therapeutic exercise minutes (CPT 28568): 10 minutes       ASSESSMENT:   Response to treatment: Condition improving, reduced myofascial tension in sub-occipital space and reporting a significant reduction in headaches.     PLAN/RECOMMENDATIONS:   Plan for treatment: therapy treatment to continue next visit.  Planned interventions for next visit: continue with current treatment.

## 2018-08-30 NOTE — OP THERAPY PROGRESS SUMMARY
Outpatient Physical Therapy  PROGRESS SUMMARY NOTE      [facility] [place] [address], [address]  [place] [address]  Phone:  [phone]    Date of Visit: 08/29/2018    Patient: Shira Guzman  YOB: 1970  MRN: [id number]     Referring Provider: Guera Montanez M.D.  [address]  [address]  [place] [address]   Referring Diagnosis Strain of muscle, fascia and tendon at neck level, initial encounter [S16.1XXA]     Visit Diagnoses     ICD-10-CM   1. Acute cervical myofascial strain, subsequent encounter S16.1XXD       Rehab Potential: good    Physical Therapy Occurrence Codes    Date of onset of impairment:  6/20/18   Date physical therapy care plan established or reviewed:  7/27/18   Date physical therapy treatment started:  7/27/18            Functional Limitation G-Codes and Severity Modifiers      Current status:     Goal status:           Objective Findings and Assessment:   Patient progression towards goals: Reduction in cervical headaches and reduction in myofascial tone  Improved cervical range of motion    Condition improving and I think further benefit can be achieved with PT    Objective findings and assessment details: Decreased cervical range of motion for R. Rotation, R. Posterior quadrant, and cervical extension all with complaint of pain R. Mid cervical. Active is same as passive  TTP R. C3/4 and C4/5 facet joints      Goals:   Short Term Goals:   50% improvement in cervical range of motion  Reduction in tenderness at cervical facet joints  Improved endurance for cervicothoracic posture  Short term goal time span:  2-4 weeks      Long Term Goals:    Normal cervical range of motion  No tenderness at cervical facet joints  Improved endurance for cervicothoracic posture; able to go full day without naproxen  Normal NDI score    Long term goal time span:  4-6 weeks    Plan:   Frequency:  2x week  Duration in weeks:  4        Referring provider  co-signature:  I have reviewed this plan of care and my co-signature certifies the need for services.    Physician Signature: ________________________________ Date: ______________

## 2018-09-05 ENCOUNTER — OFFICE VISIT (OUTPATIENT)
Dept: MEDICAL GROUP | Facility: MEDICAL CENTER | Age: 48
End: 2018-09-05
Payer: COMMERCIAL

## 2018-09-05 VITALS
HEART RATE: 74 BPM | RESPIRATION RATE: 20 BRPM | OXYGEN SATURATION: 98 % | WEIGHT: 130 LBS | TEMPERATURE: 98.7 F | DIASTOLIC BLOOD PRESSURE: 66 MMHG | HEIGHT: 65 IN | SYSTOLIC BLOOD PRESSURE: 114 MMHG | BODY MASS INDEX: 21.66 KG/M2

## 2018-09-05 DIAGNOSIS — S16.1XXD CERVICAL MYOFASCIAL STRAIN, SUBSEQUENT ENCOUNTER: ICD-10-CM

## 2018-09-05 DIAGNOSIS — F41.1 GENERALIZED ANXIETY DISORDER: ICD-10-CM

## 2018-09-05 DIAGNOSIS — R29.898 DECREASED ROM OF NECK: ICD-10-CM

## 2018-09-05 PROCEDURE — 99214 OFFICE O/P EST MOD 30 MIN: CPT | Performed by: FAMILY MEDICINE

## 2018-09-05 RX ORDER — VENLAFAXINE HYDROCHLORIDE 37.5 MG/1
37.5 CAPSULE, EXTENDED RELEASE ORAL DAILY
Qty: 30 CAP | Refills: 3 | Status: SHIPPED | OUTPATIENT
Start: 2018-09-05 | End: 2018-11-15 | Stop reason: SDUPTHER

## 2018-09-05 NOTE — PATIENT INSTRUCTIONS
Venlafaxine extended-release capsules  What is this medicine?  VENLAFAXINE(MARIO la fax een) is used to treat depression, anxiety and panic disorder.  This medicine may be used for other purposes; ask your health care provider or pharmacist if you have questions.  COMMON BRAND NAME(S): Effexor XR  What should I tell my health care provider before I take this medicine?  They need to know if you have any of these conditions:  -bleeding disorders  -glaucoma  -heart disease  -high blood pressure  -high cholesterol  -kidney disease  -liver disease  -low levels of sodium in the blood  -joselyn or bipolar disorder  -seizures  -suicidal thoughts, plans, or attempt; a previous suicide attempt by you or a family  -take medicines that treat or prevent blood clots  -thyroid disease  -an unusual or allergic reaction to venlafaxine, desvenlafaxine, other medicines, foods, dyes, or preservatives  -pregnant or trying to get pregnant  -breast-feeding  How should I use this medicine?  Take this medicine by mouth with a full glass of water. Follow the directions on the prescription label. Do not cut, crush, or chew this medicine. Take it with food. If needed, the capsule may be carefully opened and the entire contents sprinkled on a spoonful of cool applesauce. Swallow the applesauce/pellet mixture right away without chewing and follow with a glass of water to ensure complete swallowing of the pellets. Try to take your medicine at about the same time each day. Do not take your medicine more often than directed. Do not stop taking this medicine suddenly except upon the advice of your doctor. Stopping this medicine too quickly may cause serious side effects or your condition may worsen.  A special MedGuide will be given to you by the pharmacist with each prescription and refill. Be sure to read this information carefully each time.  Talk to your pediatrician regarding the use of this medicine in children. Special care may be  needed.  Overdosage: If you think you have taken too much of this medicine contact a poison control center or emergency room at once.  NOTE: This medicine is only for you. Do not share this medicine with others.  What if I miss a dose?  If you miss a dose, take it as soon as you can. If it is almost time for your next dose, take only that dose. Do not take double or extra doses.  What may interact with this medicine?  Do not take this medicine with any of the following medications:  -certain medicines for fungal infections like fluconazole, itraconazole, ketoconazole, posaconazole, voriconazole  -cisapride  -desvenlafaxine  -dofetilide  -dronedarone  -duloxetine  -levomilnacipran  -linezolid  -MAOIs like Carbex, Eldepryl, Marplan, Nardil, and Parnate  -methylene blue (injected into a vein)  -milnacipran  -pimozide  -thioridazine  -ziprasidone  This medicine may also interact with the following medications:  -amphetamines  -aspirin and aspirin-like medicines  -certain medicines for depression, anxiety, or psychotic disturbances  -certain medicines for migraine headaches like almotriptan, eletriptan, frovatriptan, naratriptan, rizatriptan, sumatriptan, zolmitriptan  -certain medicines for sleep  -certain medicines that treat or prevent blood clots like dalteparin, enoxaparin, warfarin  -cimetidine  -clozapine  -diuretics  -fentanyl  -furazolidone  -indinavir  -isoniazid  -lithium  -metoprolol  -NSAIDS, medicines for pain and inflammation, like ibuprofen or naproxen  -other medicines that prolong the QT interval (cause an abnormal heart rhythm)  -procarbazine  -rasagiline  -supplements like Lizbeth's wort, kava kava, valerian  -tramadol  -tryptophan  This list may not describe all possible interactions. Give your health care provider a list of all the medicines, herbs, non-prescription drugs, or dietary supplements you use. Also tell them if you smoke, drink alcohol, or use illegal drugs. Some items may interact with  your medicine.  What should I watch for while using this medicine?  Tell your doctor if your symptoms do not get better or if they get worse. Visit your doctor or health care professional for regular checks on your progress. Because it may take several weeks to see the full effects of this medicine, it is important to continue your treatment as prescribed by your doctor.  Patients and their families should watch out for new or worsening thoughts of suicide or depression. Also watch out for sudden changes in feelings such as feeling anxious, agitated, panicky, irritable, hostile, aggressive, impulsive, severely restless, overly excited and hyperactive, or not being able to sleep. If this happens, especially at the beginning of treatment or after a change in dose, call your health care professional.  This medicine can cause an increase in blood pressure. Check with your doctor for instructions on monitoring your blood pressure while taking this medicine.  You may get drowsy or dizzy. Do not drive, use machinery, or do anything that needs mental alertness until you know how this medicine affects you. Do not stand or sit up quickly, especially if you are an older patient. This reduces the risk of dizzy or fainting spells. Alcohol may interfere with the effect of this medicine. Avoid alcoholic drinks.  Your mouth may get dry. Chewing sugarless gum, sucking hard candy and drinking plenty of water will help. Contact your doctor if the problem does not go away or is severe.  What side effects may I notice from receiving this medicine?  Side effects that you should report to your doctor or health care professional as soon as possible:  -allergic reactions like skin rash, itching or hives, swelling of the face, lips, or tongue  -anxious  -breathing problems  -confusion  -changes in vision  -chest pain  -confusion  -elevated mood, decreased need for sleep, racing thoughts, impulsive behavior  -eye pain  -fast, irregular  heartbeat  -feeling faint or lightheaded, falls  -feeling agitated, angry, or irritable  -hallucination, loss of contact with reality  -high blood pressure  -loss of balance or coordination  -palpitations  -redness, blistering, peeling or loosening of the skin, including inside the mouth  -restlessness, pacing, inability to keep still  -seizures  -stiff muscles  -suicidal thoughts or other mood changes  -trouble passing urine or change in the amount of urine  -trouble sleeping  -unusual bleeding or bruising  -unusually weak or tired  -vomiting  Side effects that usually do not require medical attention (report to your doctor or health care professional if they continue or are bothersome):  -change in sex drive or performance  -change in appetite or weight  -constipation  -dizziness  -dry mouth  -headache  -increased sweating  -nausea  -tired  This list may not describe all possible side effects. Call your doctor for medical advice about side effects. You may report side effects to FDA at 3-265-FDA-4078.  Where should I keep my medicine?  Keep out of the reach of children.  Store at a controlled temperature between 20 and 25 degrees C (68 degrees and 77 degrees F), in a dry place. Throw away any unused medicine after the expiration date.  NOTE: This sheet is a summary. It may not cover all possible information. If you have questions about this medicine, talk to your doctor, pharmacist, or health care provider.  © 2018 [employer]vier/Gold Standard (2017-05-18 18:38:02)

## 2018-09-05 NOTE — ASSESSMENT & PLAN NOTE
Patient did not tolerate the Celexa - she felt like a zombie.  She denies any suicidal thoughts or plans.

## 2018-09-12 ENCOUNTER — PHYSICAL THERAPY (OUTPATIENT)
Dept: PHYSICAL THERAPY | Facility: REHABILITATION | Age: 48
End: 2018-09-12
Attending: FAMILY MEDICINE
Payer: COMMERCIAL

## 2018-09-12 DIAGNOSIS — S16.1XXD ACUTE CERVICAL MYOFASCIAL STRAIN, SUBSEQUENT ENCOUNTER: ICD-10-CM

## 2018-09-12 PROCEDURE — 97110 THERAPEUTIC EXERCISES: CPT

## 2018-09-12 PROCEDURE — 97140 MANUAL THERAPY 1/> REGIONS: CPT

## 2018-09-12 NOTE — OP THERAPY DAILY TREATMENT
Outpatient Physical Therapy  DAILY TREATMENT     [facility] Outpatient Physical Therapy [address], [address]  [place] [address]  Phone:  [phone]    Date: 09/12/2018    Patient: Shira Guzman  YOB: 1970  MRN: [id number]     Time Calculation  Start time: 0830  Stop time: 0900 Time Calculation (min): 30 minutes     Chief Complaint: No chief complaint on file.    Visit #: 11    SUBJECTIVE:  Taking naproxen sporadically. Having headaches infrequently, has not had one is several weeks. This AM neck feels tense and stiff, these are the early warning signs of upcoming headache.     OBJECTIVE:  Current objective measures:     TTP R. Mid cervical   hypermoibile OA extension  Seated posture is slumped with increased thoracic kyphosis. CT junction stiffness    Exercises/Treatment  Time-based treatments/modalities:  Manual therapy minutes (CPT 12201): 22 minutes  Therapeutic exercise minutes (CPT 40881): 8 minutes         ASSESSMENT:   Response to treatment: Same symptom presentation as before, tension in R. Side of neck that limits R. Cervical rotation. Not much improvement made for Cervical R. Rotation ROM within session, but improved cervical flexion and L. Rot. Future session focus on thoracic mobility and cervical stabilization.     PLAN/RECOMMENDATIONS:   Plan for treatment: therapy treatment to continue next visit.  Planned interventions for next visit: continue with current treatment .

## 2018-09-13 NOTE — ASSESSMENT & PLAN NOTE
Patient complains of decreased range of motion of the neck with pain in the muscles surrounding the area.  She denies any numbness or tingling.  No loss of strength.  No loss of bowel or bladder function.

## 2018-09-13 NOTE — PROGRESS NOTES
Subjective:     Chief Complaint   Patient presents with   • Follow-Up     meds & physical therapy       Shira Jessica Guzman is a 47 y.o. female here today for evaluation and management of:    Generalized anxiety disorder  Patient did not tolerate the Celexa - she felt like a zombie.  She denies any suicidal thoughts or plans.    Cervical myofascial strain  Patient complains of decreased range of motion of the neck with pain in the muscles surrounding the area.  She denies any numbness or tingling.  No loss of strength.  No loss of bowel or bladder function.       No Known Allergies    Current medicines (including changes today)  Current Outpatient Prescriptions   Medication Sig Dispense Refill   • venlafaxine XR (EFFEXOR XR) 37.5 MG CAPSULE SR 24 HR Take 1 Cap by mouth every day. 30 Cap 3   • ibuprofen (MOTRIN) 800 MG Tab Take 1 Tab by mouth every 8 hours as needed. 90 Tab 3   • tamoxifen (NOLVADEX) 20 MG tablet Take 20 mg by mouth 2 times a day.       No current facility-administered medications for this visit.        She  has a past medical history of Anxiety; Asthma; Cancer (HCC); and PCOS (polycystic ovarian syndrome).    Patient Active Problem List    Diagnosis Date Noted   • Decreased ROM of neck 09/05/2018   • Current moderate episode of major depressive disorder without prior episode ([facility]) 05/29/2018   • Joint pain 11/09/2017   • Family history of rheumatoid arthritis 11/09/2017   • S/P mastectomy, bilateral 10/16/2017   • Malignant neoplasm of left female breast (HCC) 06/01/2017   • Cyst of left breast 11/05/2015   • Cervical myofascial strain 10/01/2015   • Breast lump 10/01/2015   • Generalized anxiety disorder 06/03/2015   • PCOS (polycystic ovarian syndrome)        ROS   No fever or chills.  No nausea or vomiting.  No chest pain or palpitations.  No cough or SOB.  No pain with urination or hematuria.  No black or bloody stools.       Objective:     Blood pressure 114/66, pulse 74, temperature 37.1 °C  "(98.7 °F), resp. rate 20, height 1.651 m (5' 5\"), weight 59 kg (130 lb), SpO2 98 %. Body mass index is 21.63 kg/m².   Physical Exam:  Well developed, well nourished.  Alert, oriented in no acute distress.  Eye contact is good, speech goal directed, affect calm  Eyes: conjunctiva non-injected, sclera non-icteric.  Neck Supple.  No adenopathy or masses in the neck or supraclavicular regions. No thyromegaly.  No spinous process tenderness.  Tenderness to palpation of the right trapezius  Lungs: clear to auscultation bilaterally with good excursion. No wheezes or rhonchi  CV: regular rate and rhythm. No murmur   neurologic: 5/5 motor strength of the upper extremities with normal sensation          Assessment and Plan:   The following treatment plan was discussed    1. Generalized anxiety disorder  Switch to Effexor 37.5 mg daily.  Increase as needed  - venlafaxine XR (EFFEXOR XR) 37.5 MG CAPSULE SR 24 HR; Take 1 Cap by mouth every day.  Dispense: 30 Cap; Refill: 3    2. Cervical myofascial strain, subsequent encounter  Refer to physical therapy  - REFERRAL TO PHYSICAL THERAPY Reason for Therapy: Eval/Treat/Report    3. Decreased ROM of neck  Refer to physical therapy.  Stretches given  - REFERRAL TO PHYSICAL THERAPY Reason for Therapy: Eval/Treat/Report    Any change or worsening of signs or symptoms, patient encouraged to follow-up or report to the emergency room for further evaluation. Patient understands and agrees.    Followup: Return in about 3 months (around 12/5/2018).           "

## 2018-09-28 ENCOUNTER — APPOINTMENT (OUTPATIENT)
Dept: PHYSICAL THERAPY | Facility: REHABILITATION | Age: 48
End: 2018-09-28
Attending: FAMILY MEDICINE
Payer: COMMERCIAL

## 2018-10-05 ENCOUNTER — PHYSICAL THERAPY (OUTPATIENT)
Dept: PHYSICAL THERAPY | Facility: REHABILITATION | Age: 48
End: 2018-10-05
Attending: FAMILY MEDICINE
Payer: COMMERCIAL

## 2018-10-05 DIAGNOSIS — S16.1XXD ACUTE CERVICAL MYOFASCIAL STRAIN, SUBSEQUENT ENCOUNTER: ICD-10-CM

## 2018-10-05 PROCEDURE — 97014 ELECTRIC STIMULATION THERAPY: CPT

## 2018-10-05 PROCEDURE — 97140 MANUAL THERAPY 1/> REGIONS: CPT

## 2018-10-05 PROCEDURE — 97110 THERAPEUTIC EXERCISES: CPT

## 2018-10-05 NOTE — OP THERAPY DAILY TREATMENT
Outpatient Physical Therapy  DAILY TREATMENT     [facility] Outpatient Physical Therapy [address] [facility], [address]  [place] [address]  Phone:  [phone]    Date: 10/05/2018    Patient: Shira Guzman  YOB: 1970  MRN: [id number]     Time Calculation  Start time: 0730  Stop time: 0815 Time Calculation (min): 45 minutes     Chief Complaint: No chief complaint on file.    Visit #: 12    SUBJECTIVE:  My neck continues to be sore and stiff for turning my head to the right. Had a HA after last PT visit but other than that none. Denies paraesthesia into arm or pain radiating into arm. Tender in muscles along the back of the neck and back of shoulder. Has not taken Naproxen much at all in past month.     OBJECTIVE:  Current objective measures:     A/PROM R. Rotation 60 deg.   AA rotation restriction to the R.     Post tx  A/PROM R. Rotation 65 deg.       Therapeutic Exercises (CPT 56701):     1. Tenns ball upper cervical mobilization flexion, HEP, 25 reps, several times per day      Time-based treatments/modalities:  Manual therapy minutes (CPT 66844): 17 minutes  Therapeutic exercise minutes (CPT 81453): 8 minutes  Functional training, self care minutes (CPT 96771): 5 minutes         ASSESSMENT:   Response to treatment: Pt presents to clinic after non-attendance for one month. Same impaired R. Rotation restriction. In testing C1/2 was quite limited for rotation R. Tx was provided to improve segmental cervical mobility with mild improvement in range of motion within session. HEP progression for upper cervical flexion on tennis ball to reduce myofascial restriction in upper cervical spine.     PLAN/RECOMMENDATIONS:   Plan for treatment: therapy treatment to continue next visit.  Planned interventions for next visit: continue with current treatment.

## 2018-10-12 ENCOUNTER — PHYSICAL THERAPY (OUTPATIENT)
Dept: PHYSICAL THERAPY | Facility: REHABILITATION | Age: 48
End: 2018-10-12
Attending: FAMILY MEDICINE
Payer: COMMERCIAL

## 2018-10-12 DIAGNOSIS — S16.1XXD ACUTE CERVICAL MYOFASCIAL STRAIN, SUBSEQUENT ENCOUNTER: ICD-10-CM

## 2018-10-12 PROCEDURE — 97140 MANUAL THERAPY 1/> REGIONS: CPT

## 2018-10-12 NOTE — OP THERAPY DAILY TREATMENT
Outpatient Physical Therapy  DAILY TREATMENT     [facility] Outpatient Physical Therapy [address], [address]  [place] [address]  Phone:  [phone]    Date: 10/12/2018    Patient: Shira Guzman  YOB: 1970  MRN: [id number]     Time Calculation  Start time: 0730  Stop time: 0815 Time Calculation (min): 45 minutes     Chief Complaint: No chief complaint on file.    Visit #: 13    SUBJECTIVE:  I am hearing more noise from neck, more popping with head turns. I feel like I can turn my head further to right with less tension. After last PT session there was some neck soreness but that's typical response for me after been away from PT for awhile.     OBJECTIVE:  Current objective measures:       Pre tx: 70 deg. Cervical R. Rotation    Post tx: 80 deg. Cervical R. rotatino    Restriction AA rotation on R.   Restricted C2 side glide R    Muscular restriction C2/C1 on R.       Therapeutic Treatments and Modalities:     1. Manual Therapy (CPT 35211), cervical, STW and mobilization to improve Cs R. rotation    Time-based treatments/modalities:  Manual therapy minutes (CPT 61164): 30 minutes         ASSESSMENT:   Response to treatment: Limitation of cervical R. Rotation showed improvement this week. Patient reported soreness in neck at end of session but mobility was improved.     PLAN/RECOMMENDATIONS:   Plan for treatment: therapy treatment to continue next visit.  Planned interventions for next visit: continue with current treatment.

## 2018-10-17 ENCOUNTER — PHYSICAL THERAPY (OUTPATIENT)
Dept: PHYSICAL THERAPY | Facility: REHABILITATION | Age: 48
End: 2018-10-17
Attending: FAMILY MEDICINE
Payer: COMMERCIAL

## 2018-10-17 DIAGNOSIS — S16.1XXD ACUTE CERVICAL MYOFASCIAL STRAIN, SUBSEQUENT ENCOUNTER: ICD-10-CM

## 2018-10-17 PROCEDURE — 97110 THERAPEUTIC EXERCISES: CPT

## 2018-10-17 PROCEDURE — 97140 MANUAL THERAPY 1/> REGIONS: CPT

## 2018-10-17 NOTE — OP THERAPY DAILY TREATMENT
Outpatient Physical Therapy  DAILY TREATMENT     [facility] Outpatient Physical Therapy [address], [address]  [place] [address]  Phone:  [phone]    Date: 10/17/2018    Patient: Shira Guzman  YOB: 1970  MRN: [id number]     Time Calculation  Start time: 0730  Stop time: 0800 Time Calculation (min): 30 minutes     Chief Complaint: No chief complaint on file.    Visit #: 14    SUBJECTIVE:  After last PT session I felt like we worked into the tight area of my neck and it helped but I also had intermittent head aches throughout the rest of the week.     OBJECTIVE:  Current objective measures:   R. Rot 45 deg pre measure    R. Rot 80 deg. Post measure    Restriction AA R. Rotation mm guarding.           Therapeutic Exercises (CPT 22241):     1. Self massage upper cervical tennis ball    Therapeutic Treatments and Modalities:     1. Manual Therapy (CPT 83587), Cervical    Therapeutic Treatment and Modalities Summary: STW and mobilization to improve cervical rotation R.    Time-based treatments/modalities:  Manual therapy minutes (CPT 15572): 18 minutes  Therapeutic exercise minutes (CPT 93172): 7 minutes  Functional training, self care minutes (CPT 12506): 5 minutes         ASSESSMENT:   Response to treatment: Significant gain in cervical ROM achieved within session. Tx focussed on AA mobility. Tolerated tx well.     PLAN/RECOMMENDATIONS:   Plan for treatment: therapy treatment to continue next visit.  Planned interventions for next visit: continue with current treatment.

## 2018-10-26 ENCOUNTER — APPOINTMENT (OUTPATIENT)
Dept: PHYSICAL THERAPY | Facility: REHABILITATION | Age: 48
End: 2018-10-26
Attending: FAMILY MEDICINE
Payer: COMMERCIAL

## 2018-10-26 DIAGNOSIS — S16.1XXD ACUTE CERVICAL MYOFASCIAL STRAIN, SUBSEQUENT ENCOUNTER: ICD-10-CM

## 2018-10-26 PROCEDURE — 97110 THERAPEUTIC EXERCISES: CPT

## 2018-10-26 PROCEDURE — 97140 MANUAL THERAPY 1/> REGIONS: CPT

## 2018-10-26 NOTE — OP THERAPY DAILY TREATMENT
Outpatient Physical Therapy  DAILY TREATMENT     [facility] Outpatient Physical Therapy [address], [address]  [place] [address]  Phone:  [phone]    Date: 10/26/2018    Patient: Shira Guzman  YOB: 1970  MRN: [id number]     Time Calculation  Start time: 0830  Stop time: 0900 Time Calculation (min): 30 minutes     Chief Complaint: No chief complaint on file.    Visit #: 15    SUBJECTIVE:  Experienced a headache after last PT session and a few more throughout the week but the next day my neck has more motion with less tension.           OBJECTIVE:  Current objective measures:     50% restricted R. Cervical rotation    Post manual tx Upper cervical improves 5-10%; post manual thoracic 10% improvement    Final R. Rotation 60 deg. Started at 45 deg.        Poor seating posture, fwd head, excess kyphosis, fwd shoulders.     Myofascial restriction R. Sub-occipital      Therapeutic Treatments and Modalities:     1. Manual Therapy (CPT 31597), cervical and thoracic    Time-based treatments/modalities:  Manual therapy minutes (CPT 15637): 20 minutes  Therapeutic exercise minutes (CPT 35381): 10 minutes       ASSESSMENT:   Response to treatment: Improved R. Rotation post tx, not 100% but improved.     PLAN/RECOMMENDATIONS:   Plan for treatment: therapy treatment to continue next visit.  Planned interventions for next visit: continue with current treatment.

## 2018-11-02 ENCOUNTER — PHYSICAL THERAPY (OUTPATIENT)
Dept: PHYSICAL THERAPY | Facility: REHABILITATION | Age: 48
End: 2018-11-02
Attending: FAMILY MEDICINE
Payer: COMMERCIAL

## 2018-11-02 DIAGNOSIS — S16.1XXD ACUTE CERVICAL MYOFASCIAL STRAIN, SUBSEQUENT ENCOUNTER: ICD-10-CM

## 2018-11-02 PROCEDURE — 97014 ELECTRIC STIMULATION THERAPY: CPT

## 2018-11-02 PROCEDURE — 97140 MANUAL THERAPY 1/> REGIONS: CPT

## 2018-11-02 NOTE — OP THERAPY DAILY TREATMENT
Outpatient Physical Therapy  DAILY TREATMENT     [facility] Outpatient Physical Therapy [address], [address]  [place] [address]  Phone:  [phone]    Date: 11/02/2018    Patient: Shira Guzman  YOB: 1970  MRN: [id number]     Time Calculation  Start time: 0730  Stop time: 0815 Time Calculation (min): 45 minutes     Chief Complaint: No chief complaint on file.    Visit #: 16    SUBJECTIVE:  I did have discomfort in neck at end of last session but it was not bad as before. Feels like pressure in the right side of neck.     OBJECTIVE:  Current objective measures:     Cervical R. Rotation 50 deg.     Improved to R. 70 deg.     Hypomobility localized at C1/2 on R.               Therapeutic Treatments and Modalities:     1. Manual Therapy (CPT 53007), cervical and thoracic    Time-based treatments/modalities:  Manual therapy minutes (CPT 27058): 30 minutes       ASSESSMENT:   Response to treatment: Improved mobility post session but patient made comment that they suspected a HA would be provoked. R. Rotation is restricted at C1/2 consistently, improvements made within session but not maintained entirely throughout week. Discussed HEP compliance.     PLAN/RECOMMENDATIONS:   Plan for treatment: therapy treatment to continue next visit.  Planned interventions for next visit: continue with current treatment.

## 2018-11-09 ENCOUNTER — PHYSICAL THERAPY (OUTPATIENT)
Dept: PHYSICAL THERAPY | Facility: REHABILITATION | Age: 48
End: 2018-11-09
Attending: FAMILY MEDICINE
Payer: COMMERCIAL

## 2018-11-09 DIAGNOSIS — S16.1XXD ACUTE CERVICAL MYOFASCIAL STRAIN, SUBSEQUENT ENCOUNTER: ICD-10-CM

## 2018-11-09 PROCEDURE — 97110 THERAPEUTIC EXERCISES: CPT

## 2018-11-09 NOTE — OP THERAPY DAILY TREATMENT
Outpatient Physical Therapy  DAILY TREATMENT     [facility] Outpatient Physical Therapy [address], [address]  [place] [address]  Phone:  [phone]    Date: 11/09/2018    Patient: Shira Guzman  YOB: 1970  MRN: [id number]     Time Calculation  Start time: 0730  Stop time: 0800 Time Calculation (min): 30 minutes     Chief Complaint: No chief complaint on file.    Visit #: 17    SUBJECTIVE:  I had a HA after last PT session. The following day upper neck was really agitated. I had to take naproxen to get relief. After that passed. My neck feels really good today.     OBJECTIVE:  Current objective measures:   R. Rotation is normal to L.           Therapeutic Exercises (CPT 68209):     1. Prone cervical retraction    2. Quadraped retraction w/ rotation    3. FR thoracic ext mob.    4. FR STW posterior shoulder       Time-based treatments/modalities:  Therapeutic exercise minutes (CPT 79596): 30 minutes       ASSESSMENT:   Response to treatment: Patient reports putting extra effort into maintaining cervical mobility, she pushed through some HA but end result was reduction in neck tension and maintained improved cervical mobility.     PLAN/RECOMMENDATIONS:   Plan for treatment: therapy treatment to continue next visit.  Planned interventions for next visit: continue with current treatment.

## 2018-11-15 DIAGNOSIS — F41.1 GENERALIZED ANXIETY DISORDER: ICD-10-CM

## 2018-11-15 RX ORDER — VENLAFAXINE HYDROCHLORIDE 37.5 MG/1
37.5 CAPSULE, EXTENDED RELEASE ORAL DAILY
Qty: 30 CAP | Refills: 6 | Status: SHIPPED | OUTPATIENT
Start: 2018-11-15 | End: 2018-12-20

## 2018-11-16 ENCOUNTER — PHYSICAL THERAPY (OUTPATIENT)
Dept: PHYSICAL THERAPY | Facility: REHABILITATION | Age: 48
End: 2018-11-16
Attending: FAMILY MEDICINE
Payer: COMMERCIAL

## 2018-11-16 DIAGNOSIS — S16.1XXD ACUTE CERVICAL MYOFASCIAL STRAIN, SUBSEQUENT ENCOUNTER: ICD-10-CM

## 2018-11-16 PROCEDURE — 97110 THERAPEUTIC EXERCISES: CPT

## 2018-11-16 PROCEDURE — 97140 MANUAL THERAPY 1/> REGIONS: CPT

## 2018-11-16 NOTE — OP THERAPY DAILY TREATMENT
Outpatient Physical Therapy  DAILY TREATMENT     [facility] Outpatient Physical Therapy [address], [address]  [place] [address]  Phone:  [phone]    Date: 11/16/2018    Patient: Shira Guzman  YOB: 1970  MRN: [id number]     Time Calculation  Start time: 0730  Stop time: 0800 Time Calculation (min): 30 minutes     Chief Complaint: No chief complaint on file.    Visit #: 18    SUBJECTIVE:  No headache after last PT session. Did have possible increase in neck tension. Did not have that same intense discomfort that have had previous times.     OBJECTIVE:  Current objective measures:   TTP R. Sub-occipitals and C1/2     25% lack of cervical R. Rotation    By end of session patient had normal cervical rotation.           Therapeutic Exercises (CPT 53958):     1. Prone cervical retraction    2. Quadraped retraction w/ rotation    3. FR thoracic ext mob.    4. FR STW posterior shoulder       Time-based treatments/modalities:  Manual therapy minutes (CPT 21920): 15 minutes  Therapeutic exercise minutes (CPT 31751): 15 minutes       ASSESSMENT:   Response to treatment: For past 2 weeks patient has been mostly non-symptomatic and R. Rotation was either mildly restricted or normal. In today's session she had a restriction in cervical mobility that was corrected for through thoracic and pectoral stretching and cervical rotation exercise. We agreed she is appropriate from D/C from PT today.     PLAN/RECOMMENDATIONS:   Plan for treatment: discharge patient due to accomplished goals.  Planned interventions for next visit: D/C .
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
## 2018-12-20 ENCOUNTER — OFFICE VISIT (OUTPATIENT)
Dept: MEDICAL GROUP | Facility: LAB | Age: 48
End: 2018-12-20
Payer: COMMERCIAL

## 2018-12-20 VITALS
HEART RATE: 86 BPM | DIASTOLIC BLOOD PRESSURE: 60 MMHG | OXYGEN SATURATION: 96 % | HEIGHT: 65 IN | TEMPERATURE: 98.9 F | WEIGHT: 130 LBS | SYSTOLIC BLOOD PRESSURE: 104 MMHG | BODY MASS INDEX: 21.66 KG/M2 | RESPIRATION RATE: 20 BRPM

## 2018-12-20 DIAGNOSIS — Z13.220 SCREENING FOR HYPERLIPIDEMIA: ICD-10-CM

## 2018-12-20 DIAGNOSIS — Z13.1 SCREENING FOR DIABETES MELLITUS: ICD-10-CM

## 2018-12-20 DIAGNOSIS — Z13.0 SCREENING FOR DEFICIENCY ANEMIA: ICD-10-CM

## 2018-12-20 DIAGNOSIS — Z17.0 MALIGNANT NEOPLASM OF LEFT BREAST IN FEMALE, ESTROGEN RECEPTOR POSITIVE, UNSPECIFIED SITE OF BREAST (HCC): ICD-10-CM

## 2018-12-20 DIAGNOSIS — L71.9 ROSACEA: ICD-10-CM

## 2018-12-20 DIAGNOSIS — Z83.2 FAMILY HISTORY OF AUTOIMMUNE DISORDER: ICD-10-CM

## 2018-12-20 DIAGNOSIS — C50.912 MALIGNANT NEOPLASM OF LEFT BREAST IN FEMALE, ESTROGEN RECEPTOR POSITIVE, UNSPECIFIED SITE OF BREAST (HCC): ICD-10-CM

## 2018-12-20 DIAGNOSIS — Z13.29 SCREENING FOR THYROID DISORDER: ICD-10-CM

## 2018-12-20 DIAGNOSIS — F32.1 CURRENT MODERATE EPISODE OF MAJOR DEPRESSIVE DISORDER WITHOUT PRIOR EPISODE (HCC): ICD-10-CM

## 2018-12-20 DIAGNOSIS — Z13.21 ENCOUNTER FOR VITAMIN DEFICIENCY SCREENING: ICD-10-CM

## 2018-12-20 PROCEDURE — 99214 OFFICE O/P EST MOD 30 MIN: CPT | Performed by: FAMILY MEDICINE

## 2018-12-20 RX ORDER — BUPROPION HYDROCHLORIDE 150 MG/1
150 TABLET ORAL EVERY MORNING
Qty: 30 TAB | Refills: 6 | Status: SHIPPED | OUTPATIENT
Start: 2018-12-20 | End: 2021-08-11

## 2018-12-20 RX ORDER — METRONIDAZOLE 7.5 MG/G
GEL TOPICAL
Qty: 1 TUBE | Refills: 6 | Status: SHIPPED | OUTPATIENT
Start: 2018-12-20 | End: 2021-08-11

## 2018-12-21 NOTE — ASSESSMENT & PLAN NOTE
7/11/18  Recently weaned off Wellbutrin because it inhibits the Arimidex.  Switched to citalopram (only escitalopram, citalopram, and effexor are non-inhibitors)  Took Effexor 20 years ago and she didn't think it worked  Lexapro decreased her libido and she felt flat  She is stopping the Tamoxifen in a week so she would like to go back on the Wellbutrin because she is very irritable.

## 2018-12-21 NOTE — ASSESSMENT & PLAN NOTE
Seen at [facility] and she had an endometrial thickening so they stopped the Tamoxifen.  They want her to restart zoladex and letrozole.

## 2018-12-21 NOTE — PATIENT INSTRUCTIONS
Rosacea  Introduction  Rosacea is a long-term (chronic) condition that affects the skin of the face, including the cheeks, nose, brow, and chin. This condition can also affect the eyes. Rosacea causes blood vessels near the surface of the skin to enlarge, which results in redness.  What are the causes?  The cause of this condition is not known. Certain triggers can make rosacea worse, including:  · Hot baths.  · Exercise.  · Sunlight.  · Very hot or cold temperatures.  · Hot or spicy foods and drinks.  · Drinking alcohol.  · Stress.  · Taking blood pressure medicine.  · Long-term use of topical steroids on the face.  What increases the risk?  This condition is more likely to develop in:  · People who are older than 30 years of age.  · Women.  · People who have light-colored skin (light complexion).  · People who have a family history of rosacea.  What are the signs or symptoms?  Symptoms of this condition include:  · Redness of the face.  · Red bumps or pimples on the face.  · A red, enlarged nose.  · Blushing easily.  · Red lines on the skin.  · Irritated or burning feeling in the eyes.  · Swollen eyelids.  How is this diagnosed?  This condition is diagnosed with a medical history and physical exam.  How is this treated?  There is no cure for this condition, but treatment can help to control your symptoms. Your health care provider may recommend that you see a skin specialist (dermatologist). Treatment may include:  · Antibiotic medicines that are applied to the skin or taken as a pill.  · Laser treatment to improve the appearance of the skin.  · Surgery. This is rare.  Your health care provider will also recommend the best way to take care of your skin. Even after your skin improves, you will likely need to continue treatment to prevent your rosacea from coming back.  Follow these instructions at home:  Skin Care   Take care of your skin as told by your health care provider. You may be told to do these  things:  · Wash your skin gently two or more times each day.  · Use mild soap.  · Use a sunscreen or sunblock with SPF 30 or greater.  · Use gentle cosmetics that are meant for sensitive skin.  · Shave with an electric shaver instead of a blade.  Lifestyle  · Try to keep track of what foods trigger this condition. Avoid any triggers. These may include:  ¨ Spicy foods.  ¨ Seafood.  ¨ Cheese.  ¨ Hot liquids.  ¨ Nuts.  ¨ Chocolate.  ¨ Iodized salt.  · Do not drink alcohol.  · Avoid extremely cold or hot temperatures.  · Try to reduce your stress. If you need help, talk with your health care provider.  · When you exercise, do these things to stay cool:  ¨ Limit your sun exposure.  ¨ Use a fan.  ¨ Do shorter and more frequent intervals of exercise.  General instructions  · Keep all follow-up visits as told by your health care provider. This is important.  · Take over-the-counter and prescription medicines only as told by your health care provider.  · If your eyelids are affected, apply warm compresses to them. Do this as told by your health care provider.  · If you were prescribed an antibiotic medicine, apply or take it as told by your health care provider. Do not stop using the antibiotic even if your condition improves.  Contact a health care provider if:  · Your symptoms get worse.  · Your symptoms do not improve after two months of treatment.  · You have new symptoms.  · You have any changes in vision or you have problems with your eyes, such as redness or itching.  · You feel depressed.  · You lose your appetite.  · You have trouble concentrating.  This information is not intended to replace advice given to you by your health care provider. Make sure you discuss any questions you have with your health care provider.  Document Released: 01/25/2006 Document Revised: 05/25/2017 Document Reviewed: 02/24/2016  © 2017 [employer]vier

## 2019-01-01 NOTE — PROGRESS NOTES
Subjective:     Chief Complaint   Patient presents with   • Rosacea       Shira Guzman is a 48 y.o. female here today for evaluation and management of:    Malignant neoplasm of left female breast (CMS-HCC) ([facility])  Seen at [facility] and she had an endometrial thickening so they stopped the Tamoxifen.  They want her to restart zoladex and letrozole.    Current moderate episode of major depressive disorder without prior episode ([facility])  7/11/18  Recently weaned off Wellbutrin because it inhibits the Arimidex.  Switched to citalopram (only escitalopram, citalopram, and effexor are non-inhibitors)  Took Effexor 20 years ago and she didn't think it worked  Lexapro decreased her libido and she felt flat  She is stopping the Tamoxifen in a week so she would like to go back on the Wellbutrin because she is very irritable.    Rosacea  Patient has had rosacea that has recently flared .       No Known Allergies    Current medicines (including changes today)  Current Outpatient Prescriptions   Medication Sig Dispense Refill   • buPROPion (WELLBUTRIN XL) 150 MG XL tablet Take 1 Tab by mouth every morning. 30 Tab 6   • metronidazole (METROGEL) 0.75 % gel Apply thin film to affected area BID 1 Tube 6   • ibuprofen (MOTRIN) 800 MG Tab Take 1 Tab by mouth every 8 hours as needed. 90 Tab 3     No current facility-administered medications for this visit.        She  has a past medical history of Anxiety; Asthma; Cancer ([facility]); and PCOS (polycystic ovarian syndrome).    Patient Active Problem List    Diagnosis Date Noted   • Rosacea 12/20/2018   • Decreased ROM of neck 09/05/2018   • Current moderate episode of major depressive disorder without prior episode ([facility]) 05/29/2018   • Joint pain 11/09/2017   • Family history of autoimmune disorder 11/09/2017   • S/P mastectomy, bilateral 10/16/2017   • Malignant neoplasm of left female breast ([facility]) 06/01/2017   • Cyst of left breast 11/05/2015   • Cervical myofascial strain 10/01/2015   •  "Breast lump 10/01/2015   • Generalized anxiety disorder 06/03/2015   • PCOS (polycystic ovarian syndrome)        ROS   No fever or chills.  No nausea or vomiting.  No chest pain or palpitations.  No cough or SOB.  No pain with urination or hematuria.  No black or bloody stools.       Objective:     Blood pressure 104/60, pulse 86, temperature 37.2 °C (98.9 °F), temperature source Temporal, resp. rate 20, height 1.651 m (5' 5\"), weight 59 kg (130 lb), SpO2 96 %. Body mass index is 21.63 kg/m².   Physical Exam:  Well developed, well nourished.  Alert, oriented in no acute distress.  Eye contact is good, speech goal directed, affect calm  Eyes: conjunctiva non-injected, sclera non-icteric.  Neck Supple.  No adenopathy or masses in the neck or supraclavicular regions. No thyromegaly  Lungs: clear to auscultation bilaterally with good excursion. No wheezes or rhonchi  CV: regular rate and rhythm. No murmur  Skin - papules and pustules on nose and cheeks    Assessment and Plan:   The following treatment plan was discussed    1. Malignant neoplasm of left breast in female, estrogen receptor positive, unspecified site of breast (HCC)  Patient is going off of Tamoxifen and onto a new medication.  Switch to Wellbutrin    2. Current moderate episode of major depressive disorder without prior episode (HCC)  Poor control.  Patient is making a change in medications and will not have the interaction with Wellbutrin  - buPROPion (WELLBUTRIN XL) 150 MG XL tablet; Take 1 Tab by mouth every morning.  Dispense: 30 Tab; Refill: 6    3. Rosacea  Metrogel as directed  - metronidazole (METROGEL) 0.75 % gel; Apply thin film to affected area BID  Dispense: 1 Tube; Refill: 6    4. Screening for deficiency anemia  Screening labs ordered.  Await results for counseling.  - CBC WITH DIFFERENTIAL; Future    5. Screening for diabetes mellitus  Screening labs ordered.  Await results for counseling.  - COMP METABOLIC PANEL; Future    6. Screening for " hyperlipidemia  Screening labs ordered.  Await results for counseling.  - Lipid Profile; Future    7. Screening for thyroid disorder  Screening labs ordered.  Await results for counseling.  - TSH; Future  - FREE THYROXINE; Future    8. Encounter for vitamin deficiency screening  Screening labs ordered.  Await results for counseling.  - VITAMIN D,25 HYDROXY; Future    9. Family history of autoimmune disorder  Screening labs ordered.  Await results for counseling.  - SULEIMAN REFLEXIVE PROFILE; Future    Any change or worsening of signs or symptoms, patient encouraged to follow-up or report to the emergency room for further evaluation. Patient understands and agrees.    Followup: Return in about 6 months (around 6/20/2019).

## 2019-02-22 ENCOUNTER — OFFICE VISIT (OUTPATIENT)
Dept: URGENT CARE | Facility: CLINIC | Age: 49
End: 2019-02-22
Payer: COMMERCIAL

## 2019-02-22 VITALS
HEIGHT: 65 IN | SYSTOLIC BLOOD PRESSURE: 110 MMHG | WEIGHT: 131.8 LBS | OXYGEN SATURATION: 98 % | RESPIRATION RATE: 16 BRPM | HEART RATE: 102 BPM | BODY MASS INDEX: 21.96 KG/M2 | TEMPERATURE: 97.5 F | DIASTOLIC BLOOD PRESSURE: 80 MMHG

## 2019-02-22 DIAGNOSIS — B96.89 ACUTE BACTERIAL SINUSITIS: ICD-10-CM

## 2019-02-22 DIAGNOSIS — R05.9 COUGH: ICD-10-CM

## 2019-02-22 DIAGNOSIS — J01.90 ACUTE BACTERIAL SINUSITIS: ICD-10-CM

## 2019-02-22 PROCEDURE — 99214 OFFICE O/P EST MOD 30 MIN: CPT | Performed by: NURSE PRACTITIONER

## 2019-02-22 RX ORDER — AMOXICILLIN AND CLAVULANATE POTASSIUM 875; 125 MG/1; MG/1
1 TABLET, FILM COATED ORAL 2 TIMES DAILY
Qty: 14 TAB | Refills: 0 | Status: SHIPPED | OUTPATIENT
Start: 2019-02-22 | End: 2021-08-11

## 2019-02-22 RX ORDER — BENZONATATE 200 MG/1
200 CAPSULE ORAL 3 TIMES DAILY PRN
Qty: 60 CAP | Refills: 0 | Status: SHIPPED | OUTPATIENT
Start: 2019-02-22 | End: 2021-08-11

## 2019-02-22 ASSESSMENT — ENCOUNTER SYMPTOMS
COUGH: 1
DIARRHEA: 0
CHILLS: 0
HEADACHES: 1
SPUTUM PRODUCTION: 1
WHEEZING: 0
NAUSEA: 0
MYALGIAS: 0
ORTHOPNEA: 0
SORE THROAT: 0
FEVER: 0
SHORTNESS OF BREATH: 0
EYE DISCHARGE: 0

## 2019-02-23 NOTE — PROGRESS NOTES
Subjective:      Shira Guzman is a 48 y.o. female who presents with Cough (x 4 wks, productive cough, chest congestion, wheezing and shortness of breath) and Sinus Problem (x 1 months, nasal congestion, stuffy and runny nose, headaches, bilateral ear pain)            HPI New problem. 48 year old female with 4 week history of cough, chest congestion and wheezing. She also reports sinus congestion for one month as well as bilateral ear pain. She denies fever, chills, myalgia or sore throat. She has no nausea or diarrhea at this time. Taking over the counter medications for this issue.  Patient has no known allergies.  Current Outpatient Prescriptions on File Prior to Visit   Medication Sig Dispense Refill   • buPROPion (WELLBUTRIN XL) 150 MG XL tablet Take 1 Tab by mouth every morning. (Patient not taking: Reported on 2/22/2019) 30 Tab 6   • metronidazole (METROGEL) 0.75 % gel Apply thin film to affected area BID 1 Tube 6   • ibuprofen (MOTRIN) 800 MG Tab Take 1 Tab by mouth every 8 hours as needed. 90 Tab 3     No current facility-administered medications on file prior to visit.      Social History     Social History   • Marital status:      Spouse name: N/A   • Number of children: N/A   • Years of education: N/A     Occupational History   • Not on file.     Social History Main Topics   • Smoking status: Former Smoker     Packs/day: 0.05     Years: 1.00     Types: Cigarettes     Quit date: 1/3/2014   • Smokeless tobacco: Never Used      Comment: 2 cig / day   • Alcohol use No   • Drug use: No   • Sexual activity: Yes     Partners: Male      Comment: , two children; stay home     Other Topics Concern   • Not on file     Social History Narrative   • No narrative on file     family history includes Arthritis in her mother; Diabetes in her mother; Genetic in her mother; Heart Disease in her mother; Hyperlipidemia in her father; Hypertension in her father.      Review of Systems   Constitutional:  "Positive for malaise/fatigue. Negative for chills and fever.   HENT: Positive for congestion. Negative for sore throat.    Eyes: Negative for discharge.   Respiratory: Positive for cough and sputum production. Negative for shortness of breath and wheezing.    Cardiovascular: Negative for chest pain and orthopnea.   Gastrointestinal: Negative for diarrhea and nausea.   Musculoskeletal: Negative for myalgias.   Neurological: Positive for headaches.   Endo/Heme/Allergies: Negative for environmental allergies.          Objective:     /80 (BP Location: Left arm, Patient Position: Sitting, BP Cuff Size: Adult)   Pulse (!) 102   Temp 36.4 °C (97.5 °F) (Temporal)   Resp 16   Ht 1.651 m (5' 5\")   Wt 59.8 kg (131 lb 12.8 oz)   SpO2 98%   BMI 21.93 kg/m²      Physical Exam   Constitutional: She is oriented to person, place, and time. She appears well-developed and well-nourished. No distress.   HENT:   Head: Normocephalic and atraumatic.   Right Ear: External ear and ear canal normal. Tympanic membrane is not injected and not perforated. No middle ear effusion.   Left Ear: External ear and ear canal normal. Tympanic membrane is not injected and not perforated.  No middle ear effusion.   Nose: Mucosal edema present.   Mouth/Throat: Posterior oropharyngeal erythema present. No oropharyngeal exudate.   Eyes: Conjunctivae are normal. Right eye exhibits no discharge. Left eye exhibits no discharge.   Neck: Normal range of motion. Neck supple.   Cardiovascular: Normal rate, regular rhythm and normal heart sounds.    No murmur heard.  Pulmonary/Chest: Effort normal and breath sounds normal. No respiratory distress.   Musculoskeletal: Normal range of motion.   Normal movement of all 4 extremities.   Lymphadenopathy:     She has no cervical adenopathy.        Right: No supraclavicular adenopathy present.        Left: No supraclavicular adenopathy present.   Neurological: She is alert and oriented to person, place, and " time. Gait normal.   Skin: Skin is warm and dry.   Psychiatric: She has a normal mood and affect. Her behavior is normal. Thought content normal.   Nursing note and vitals reviewed.              Assessment/Plan:     1. Acute bacterial sinusitis  amoxicillin-clavulanate (AUGMENTIN) 875-125 MG Tab   2. Cough  benzonatate (TESSALON) 200 MG capsule     Differential diagnosis, natural history, supportive care, and indications for immediate follow-up discussed at length.

## 2019-02-25 ENCOUNTER — TELEPHONE (OUTPATIENT)
Dept: MEDICAL GROUP | Facility: LAB | Age: 49
End: 2019-02-25

## 2019-02-26 NOTE — TELEPHONE ENCOUNTER
Please ask her if she has started any new cosmetics or lotions to her forehead?  Rash may not be from augmentin if it is not located anywhere else.  Any other symptoms of reaction such as hives to other parts of the body?

## 2019-02-26 NOTE — TELEPHONE ENCOUNTER
1. Caller Name: Shira Guzman                                       Call Back Number: [phone]       Patient approves a detailed voicemail message: yes    amoxicillin-clavulanate is giving her a rash on forehead. Never has reaction before.  Has a cough, getting worse. No wheezing. Is using sons inhaler   Please advise

## 2019-03-04 ENCOUNTER — OFFICE VISIT (OUTPATIENT)
Dept: URGENT CARE | Facility: CLINIC | Age: 49
End: 2019-03-04
Payer: COMMERCIAL

## 2019-03-04 ENCOUNTER — APPOINTMENT (OUTPATIENT)
Dept: RADIOLOGY | Facility: IMAGING CENTER | Age: 49
End: 2019-03-04
Attending: NURSE PRACTITIONER
Payer: COMMERCIAL

## 2019-03-04 VITALS
SYSTOLIC BLOOD PRESSURE: 118 MMHG | HEIGHT: 65 IN | WEIGHT: 133.6 LBS | RESPIRATION RATE: 14 BRPM | TEMPERATURE: 98.4 F | HEART RATE: 110 BPM | DIASTOLIC BLOOD PRESSURE: 72 MMHG | OXYGEN SATURATION: 98 % | BODY MASS INDEX: 22.26 KG/M2

## 2019-03-04 DIAGNOSIS — J22 LRTI (LOWER RESPIRATORY TRACT INFECTION): Primary | ICD-10-CM

## 2019-03-04 DIAGNOSIS — R05.9 COUGH: ICD-10-CM

## 2019-03-04 PROCEDURE — 71046 X-RAY EXAM CHEST 2 VIEWS: CPT | Mod: TC | Performed by: NURSE PRACTITIONER

## 2019-03-04 PROCEDURE — 99214 OFFICE O/P EST MOD 30 MIN: CPT | Performed by: NURSE PRACTITIONER

## 2019-03-04 RX ORDER — DOXYCYCLINE HYCLATE 100 MG
100 TABLET ORAL 2 TIMES DAILY
Qty: 20 TAB | Refills: 0 | Status: SHIPPED | OUTPATIENT
Start: 2019-03-04 | End: 2019-03-14

## 2019-03-04 RX ORDER — PROMETHAZINE HYDROCHLORIDE AND CODEINE PHOSPHATE 6.25; 1 MG/5ML; MG/5ML
5 SYRUP ORAL EVERY 6 HOURS PRN
Qty: 60 ML | Refills: 0 | Status: SHIPPED | OUTPATIENT
Start: 2019-03-04 | End: 2019-03-07

## 2019-03-04 ASSESSMENT — ENCOUNTER SYMPTOMS
EYES NEGATIVE: 1
FOCAL WEAKNESS: 0
COUGH: 1
SENSORY CHANGE: 0
FEVER: 1
WEAKNESS: 0
MUSCULOSKELETAL NEGATIVE: 1
TINGLING: 0
ABDOMINAL PAIN: 0
GASTROINTESTINAL NEGATIVE: 1
SHORTNESS OF BREATH: 0
VOMITING: 0
NAUSEA: 0
NEUROLOGICAL NEGATIVE: 1

## 2019-03-05 ENCOUNTER — TELEPHONE (OUTPATIENT)
Dept: URGENT CARE | Facility: CLINIC | Age: 49
End: 2019-03-05

## 2019-03-05 NOTE — PROGRESS NOTES
Subjective:     Shira Guzman is a 48 y.o. female who presents for Cough (x2weeks, cough, stabbing pain on right side of ribs, chest congestion, SOB, painful to take deep breathes on right side)       Cough   Associated symptoms include chest pain (Right-sided, reproducible, increases with coughing, deep breathing, movement) and a fever. Pertinent negatives include no shortness of breath.     Patient comes to urgent care with complaints of cough and congestion x 8 weeks. She was seen in urgent care on 2/22/2019 and started on Augmentin for sinusitis and Tessalon 200 mg capsules for cough. Patient states that the Tessalon tried her up but she continues to have a cough and now it is starting to hurt on the right side of her chest. She reports reproducible chest pain which worsens with coughing and taking a deep breath as well as certain movements. Denies arm pain, numbness, tingling, weakness, or other symptoms. Patient has a history of breast cancer with right mastectomy and implant and has a follow-up with her specialist in [place] in the few days. No longer on chemo. She is requesting something stronger for the cough.    PMH:  has a past medical history of Anxiety; Asthma; Cancer (HCC); and PCOS (polycystic ovarian syndrome).    MEDS:   Current Outpatient Prescriptions:   •  benzonatate (TESSALON) 200 MG capsule, Take 1 Cap by mouth 3 times a day as needed., Disp: 60 Cap, Rfl: 0  •   MG Tab, TAKE 1 TABLET BY MOUTH EVERY 8 HOURS AS NEEDED, Disp: 90 Tab, Rfl: 1  •  NON SPECIFIED, zoladex injection once monthly, Disp: , Rfl:   •  amoxicillin-clavulanate (AUGMENTIN) 875-125 MG Tab, Take 1 Tab by mouth 2 times a day. (Patient not taking: Reported on 3/4/2019), Disp: 14 Tab, Rfl: 0  •  buPROPion (WELLBUTRIN XL) 150 MG XL tablet, Take 1 Tab by mouth every morning. (Patient not taking: Reported on 2/22/2019), Disp: 30 Tab, Rfl: 6  •  metronidazole (METROGEL) 0.75 % gel, Apply thin film to affected area BID  "(Patient not taking: Reported on 3/4/2019), Disp: 1 Tube, Rfl: 6    ALLERGIES:   Allergies   Allergen Reactions   • Cillins [Penicillins] Hives     SURGHX:   Past Surgical History:   Procedure Laterality Date   • KNEE MANIPULATION  retracking of knee cap   • MASTECTOMY MODIFIED RADICAL     • OVARIAN CYSTECTOMY  @ 19 yo     SOCHX:  reports that she quit smoking about 5 years ago. Her smoking use included Cigarettes. She has a 0.05 pack-year smoking history. She has never used smokeless tobacco. She reports that she does not drink alcohol or use drugs.     FH: Reviewed with patient, not pertinent to this visit.     Review of Systems   Constitutional: Positive for fever.   HENT: Positive for congestion.    Eyes: Negative.    Respiratory: Positive for cough. Negative for shortness of breath.    Cardiovascular: Positive for chest pain (Right-sided, reproducible, increases with coughing, deep breathing, movement).   Gastrointestinal: Negative.  Negative for abdominal pain, nausea and vomiting.   Musculoskeletal: Negative.    Skin: Negative.    Neurological: Negative.  Negative for tingling, sensory change, focal weakness and weakness.   All other systems reviewed and are negative.    Objective:     /72 (BP Location: Left arm, Patient Position: Sitting, BP Cuff Size: Adult)   Pulse (!) 110   Temp 36.9 °C (98.4 °F) (Temporal)   Resp 14   Ht 1.651 m (5' 5\")   Wt 60.6 kg (133 lb 9.6 oz)   SpO2 98%   BMI 22.23 kg/m²     Physical Exam   Constitutional: She is oriented to person, place, and time. She appears well-developed and well-nourished. She is cooperative.  Non-toxic appearance. No distress.   HENT:   Head: Normocephalic and atraumatic.   Right Ear: Tympanic membrane and external ear normal.   Left Ear: Tympanic membrane and external ear normal.   Nose: Nose normal.   Mouth/Throat: Uvula is midline, oropharynx is clear and moist and mucous membranes are normal.   Eyes: Pupils are equal, round, and reactive to " light. Conjunctivae and EOM are normal.   Neck: Normal range of motion.   Cardiovascular: Regular rhythm, normal heart sounds and normal pulses.    Pulmonary/Chest: Effort normal and breath sounds normal. No respiratory distress. She has no decreased breath sounds.   Abdominal: Soft. Bowel sounds are normal.   Musculoskeletal: Normal range of motion. She exhibits no deformity.   Lymphadenopathy:     She has no cervical adenopathy.   Neurological: She is alert and oriented to person, place, and time. She has normal strength. No sensory deficit.   Skin: Skin is warm, dry and intact. Capillary refill takes less than 2 seconds.   Psychiatric: She has a normal mood and affect. Her behavior is normal.   Vitals reviewed.    Chest x-ray:    Narrative     3/4/2019 6:05 PM    HISTORY/REASON FOR EXAM: Cough.  Chest pain for 2 months    TECHNIQUE/EXAM DESCRIPTION AND NUMBER OF VIEWS:  Two views of the chest.    COMPARISON:  None.    FINDINGS:  Right mastectomy and implant placement. Axillary node dissection.    Cardiomediastinal contours are normal.    There is spiculated opacity measuring 12 mm in the right upper lung zone laterally    No pleural space process is evident.     Impression     Right upper lung zone spiculated nodule and/or small area of consolidation. Comparison to prior studies if available outside is recommended. Follow-up to resolution is advised. A CT may be indicated    Right mastectomy     Reading Provider Reading Date   Ariel Ybarra M.D. Mar 4, 2019     Signing Provider Signing Date Signing Time   Ariel Ybarra M.D. Mar 4, 2019  6:22 PM        Assessment/Plan:     1. LRTI (lower respiratory tract infection)    2. Cough  - DX-CHEST-2 VIEWS; Future  - promethazine-codeine (PHENERGAN-CODEINE) 6.25-10 MG/5ML Syrup; Take 5 mL by mouth every 6 hours as needed for Cough for up to 3 days.  Dispense: 60 mL; Refill: 0  - doxycycline (VIBRAMYCIN) 100 MG Tab; Take 1 Tab by mouth 2 times a day for 10 days.   Dispense: 20 Tab; Refill: 0    Chest x-ray reveals possible consolidation in right upper lung. Cough x 8 weeks. Rx sent electronically for doxycycline. Pt recently finished a course of Augmentin. Discussed supportive measures including increasing fluids and rest as well as OTC symptom management including acetaminophen and/or ibuprofen PRN pain and/or fever. Rx given for Phenergan-Codeine. Advised of potential sedating effects. Recommended follow up with PCP. Warning signs reviewed.    Patient advised to: Return for 1) Symptoms don't improve or worsen, or go to ER, 2) Follow up with primary care in 7-10 days.    Differential diagnosis, natural history, supportive care, and indications for immediate follow-up discussed. All questions answered. Patient agrees with the plan of care.

## 2020-11-17 ENCOUNTER — HOSPITAL ENCOUNTER (OUTPATIENT)
Facility: MEDICAL CENTER | Age: 50
End: 2020-11-17
Attending: FAMILY MEDICINE
Payer: COMMERCIAL

## 2020-11-17 ENCOUNTER — OFFICE VISIT (OUTPATIENT)
Dept: URGENT CARE | Facility: CLINIC | Age: 50
End: 2020-11-17
Payer: COMMERCIAL

## 2020-11-17 VITALS
HEIGHT: 65 IN | BODY MASS INDEX: 22.82 KG/M2 | RESPIRATION RATE: 16 BRPM | WEIGHT: 137 LBS | TEMPERATURE: 98.6 F | OXYGEN SATURATION: 96 % | DIASTOLIC BLOOD PRESSURE: 70 MMHG | SYSTOLIC BLOOD PRESSURE: 98 MMHG | HEART RATE: 89 BPM

## 2020-11-17 DIAGNOSIS — Z20.822 SUSPECTED COVID-19 VIRUS INFECTION: ICD-10-CM

## 2020-11-17 DIAGNOSIS — R43.2 AGEUSIA: ICD-10-CM

## 2020-11-17 PROCEDURE — U0003 INFECTIOUS AGENT DETECTION BY NUCLEIC ACID (DNA OR RNA); SEVERE ACUTE RESPIRATORY SYNDROME CORONAVIRUS 2 (SARS-COV-2) (CORONAVIRUS DISEASE [COVID-19]), AMPLIFIED PROBE TECHNIQUE, MAKING USE OF HIGH THROUGHPUT TECHNOLOGIES AS DESCRIBED BY CMS-2020-01-R: HCPCS

## 2020-11-17 PROCEDURE — 99214 OFFICE O/P EST MOD 30 MIN: CPT | Mod: CS | Performed by: FAMILY MEDICINE

## 2020-11-17 RX ORDER — ANASTROZOLE 1 MG/1
1 TABLET ORAL DAILY
COMMUNITY
Start: 2020-10-11

## 2020-11-17 RX ORDER — MAGNESIUM OXIDE 400 MG/1
400 TABLET ORAL DAILY
COMMUNITY

## 2020-11-17 RX ORDER — VITAMIN B COMPLEX
1000 TABLET ORAL DAILY
COMMUNITY

## 2020-11-17 ASSESSMENT — ENCOUNTER SYMPTOMS
COUGH: 0
SHORTNESS OF BREATH: 0
FEVER: 0
DIZZINESS: 0
NAUSEA: 0
SORE THROAT: 0
VOMITING: 0
HEADACHES: 1
MYALGIAS: 1
CHILLS: 0

## 2020-11-17 NOTE — PROGRESS NOTES
Subjective:   Shira Guzman is a 50 y.o. female who presents for Flu Like Symptoms (body aches and headache, no taste and smell, x5days)        URI   This is a new problem. Episode onset: 5days. The problem has been unchanged. Associated symptoms include headaches. Pertinent negatives include no coughing, nausea, rash, sore throat or vomiting. Associated symptoms comments: There has been community-wide COVID-19 exposure  Patient complains of lack of smell and taste over the past 5 days complains of body aches and headache. She has tried sleep for the symptoms. The treatment provided no relief.     PMH:  has a past medical history of Anxiety, Asthma, Cancer (HCC), and PCOS (polycystic ovarian syndrome).  MEDS:   Current Outpatient Medications:   •  anastrozole (ARIMIDEX) 1 MG Tab, Take 1 mg by mouth every day., Disp: , Rfl:   •  vitamin D (CHOLECALCIFEROL) 1000 Unit (25 mcg) Tab, Take 1,000 Units by mouth every day., Disp: , Rfl:   •  magnesium oxide (MAG-OX) 400 MG Tab tablet, Take 400 mg by mouth every day., Disp: , Rfl:   •   MG Tab, TAKE 1 TABLET BY MOUTH EVERY 8 HOURS AS NEEDED, Disp: 90 Tab, Rfl: 1  •  NON SPECIFIED, zoladex injection once monthly, Disp: , Rfl:   •  amoxicillin-clavulanate (AUGMENTIN) 875-125 MG Tab, Take 1 Tab by mouth 2 times a day. (Patient not taking: Reported on 3/4/2019), Disp: 14 Tab, Rfl: 0  •  benzonatate (TESSALON) 200 MG capsule, Take 1 Cap by mouth 3 times a day as needed. (Patient not taking: Reported on 11/17/2020), Disp: 60 Cap, Rfl: 0  •  buPROPion (WELLBUTRIN XL) 150 MG XL tablet, Take 1 Tab by mouth every morning. (Patient not taking: Reported on 2/22/2019), Disp: 30 Tab, Rfl: 6  •  metronidazole (METROGEL) 0.75 % gel, Apply thin film to affected area BID (Patient not taking: Reported on 3/4/2019), Disp: 1 Tube, Rfl: 6  ALLERGIES:   Allergies   Allergen Reactions   • Cillins [Penicillins] Hives     SURGHX:   Past Surgical History:   Procedure Laterality Date   •  KNEE MANIPULATION  retracking of knee cap   • MASTECTOMY MODIFIED RADICAL     • OVARIAN CYSTECTOMY  @ 17 yo     SOCHX:  reports that she quit smoking about 6 years ago. Her smoking use included cigarettes. She has a 0.05 pack-year smoking history. She has never used smokeless tobacco. She reports that she does not drink alcohol or use drugs.  FH:   Family History   Problem Relation Age of Onset   • Arthritis Mother         psoriatic.   • Heart Disease Mother    • Diabetes Mother    • Genetic Disorder Mother         psoriatric arthritis   • Hypertension Father    • Hyperlipidemia Father      Review of Systems   Constitutional: Negative for chills and fever.   HENT: Negative for sore throat.    Respiratory: Negative for cough and shortness of breath.    Gastrointestinal: Negative for nausea and vomiting.   Musculoskeletal: Positive for myalgias.   Skin: Negative for rash.   Neurological: Positive for headaches. Negative for dizziness.        Objective:   There were no vitals taken for this visit.  Physical Exam  Vitals signs and nursing note reviewed.   Constitutional:       General: She is not in acute distress.     Appearance: She is well-developed.   HENT:      Head: Normocephalic and atraumatic.      Right Ear: External ear normal.      Left Ear: External ear normal.      Nose: Rhinorrhea present.      Mouth/Throat:      Mouth: Mucous membranes are moist.      Pharynx: Posterior oropharyngeal erythema present.   Eyes:      Conjunctiva/sclera: Conjunctivae normal.   Cardiovascular:      Rate and Rhythm: Normal rate.   Pulmonary:      Effort: Pulmonary effort is normal. No respiratory distress.      Breath sounds: Normal breath sounds. No wheezing or rhonchi.   Abdominal:      General: There is no distension.   Musculoskeletal: Normal range of motion.   Skin:     General: Skin is warm and dry.   Neurological:      General: No focal deficit present.      Mental Status: She is alert and oriented to person, place, and  time. Mental status is at baseline.      Gait: Gait (gait at baseline) normal.   Psychiatric:         Judgment: Judgment normal.           Assessment/Plan:   1. [place]    2. Suspected COVID-19 virus infection  - COVID/SARS COV-2 PCR; Future    Other orders  - anastrozole (ARIMIDEX) 1 MG Tab; Take 1 mg by mouth every day.  - vitamin D (CHOLECALCIFEROL) 1000 Unit (25 mcg) Tab; Take 1,000 Units by mouth every day.  - magnesium oxide (MAG-OX) 400 MG Tab tablet; Take 400 mg by mouth every day.      Advised routine [facility] social distancing guielines, symptomatic and supportive measures      Discussed close monitoring, return precautions, and supportive measures of maintaining adequate fluid hydration and caloric intake, relative rest and symptom management as needed for pain and/or fever.    Differential diagnosis, natural history, supportive care, and indications for immediate follow-up discussed.     Advised the patient to follow-up with the primary care physician for recheck, reevaluation, and consideration of further management.    Please note that this dictation was created using voice recognition software. I have worked with consultants from the vendor as well as technical experts from [employer] to optimize the interface. I have made every reasonable attempt to correct obvious errors, but I expect that there are errors of grammar and possibly content that I did not discover before finalizing the note.

## 2020-11-17 NOTE — PATIENT INSTRUCTIONS
INSTRUCTIONS FOR COVID-19 OR ANY OTHER INFECTIOUS RESPIRATORY ILLNESSES    The Centers for Disease Control and Prevention (CDC) states that early indications for COVID-19 include cough, shortness of breath, difficulty breathing, or at least two of the following symptoms: chills, shaking with chills, muscle pain, headache, sore throat, and loss of taste or smell. Symptoms can range from mild to severe and may appear up to two weeks after exposure to the virus.    The practice of self-isolation and quarantine helps protect the public and your family by  preventing exposure to people who have or may have a contagious disease. Please follow the prevention steps below as based on CDC guidelines:    WHEN TO STOP ISOLATION: Persons with COVID-19 or any other infectious respiratory illness who have symptoms and were advised to care for themselves at home may discontinue home isolation under the following conditions:  · At least 24 hours have passed since recovery defined as resolution of fever without the use of fever-reducing medications; AND,  · Improvement in respiratory symptoms (e.g., cough, shortness of breath); AND,  · At least 10 days have passed since symptoms first appeared and have had no subsequent illness.    MONITOR YOUR SYMPTOMS: If your illness is worsening, seek prompt medical attention. If you have a medical emergency and need to call 911, notify the dispatch personnel that you have, or are being evaluated for confirmed or suspected COVID-19 or another infectious respiratory illness. Wear a facemask if possible.    ACTIVITY RESTRICTION: restrict activities outside your home, except for getting medical care. Do not go to work, school, or public areas. Avoid using public transportation, ride-sharing, or taxis.    SCHEDULED MEDICAL APPOINTMENTS: Notify your provider that you have, or are being evaluated for, confirmed or suspected COVID-19 or another infectious respiratory. This will help the healthcare  provider’s office safely take care of you and keep other people from getting exposed or infected.    FACEMASKS, when to wear: Anytime you are away from your home or around other people or pets. If you are unable to wear one, maintain a minimum of 6 feet distancing from others.    LIVING ENVIRONMENT: Stay in a separate room from other people and pets. If possible, use a separate bathroom, have someone else care for your pets and avoid sharing household items. Any items used should be washed thoroughly with soap and water. Clean all “high-touch” surfaces every day. Use a household cleaning spray or wipe, according to the label instructions. High touch surfaces include (but are not limited to) counters, tabletops, doorknobs, bathroom fixtures, toilets, phones, keyboards, tablets, and bedside tables.     HAND WASHING: Frequently wash hands with soap and water for at least 20 seconds,  especially after blowing your nose, coughing, or sneezing; going to the bathroom; before and after interacting with pets; and before and after eating or preparing food. If hands are visibly dirty use soap and water. If soap and water are not available, use an alcohol-based hand  with at least 60% alcohol. Avoid touching your eyes, nose, and mouth with unwashed hands. Cover your coughs and sneezes with a tissue. Throw used tissues in a lined trash can. Immediately wash your hands.    ACTIVE/FACILITATED SELF-MONITORING: Follow instructions provided by your local health department or health professionals, as appropriate. When working with your local health department check their available hours.    [facility]   Phone Number   [facility] [phone]   [facility] [place] [phone]   [place] [address]   [place] [phone]     IF YOU HAVE CONFIRMED POSITIVE COVID-19:    Those who have completely recovered from COVID-19 may have immune-boosting antibodies in their plasma--called “convalescent plasma”--that could be  used to treat critically ill COVID19 patients.    [facility] is excited to begin working with [facility] on collecting convalescent plasma from  people who have recovered from COVID-19 as part of a program to treat patients infected with the virus. This FDA-approved “emergency investigational new drug” is a special blood product containing antibodies that may give patients an extra boost to fight the virus.    To be eligible to donate convalescent plasma, you must have a prior COVID-19 diagnosis documented by a laboratory test (or a positive test result for SARS-CoV-2 antibodies) and meet additional eligibility requirements.    If you are interested in donating convalescent plasma or have any additional questions, please contact the [facility] Convalescent Plasma  at [phone] or via e-mail at [email or web address].

## 2020-11-19 DIAGNOSIS — Z20.822 SUSPECTED COVID-19 VIRUS INFECTION: ICD-10-CM

## 2020-11-19 LAB
COVID ORDER STATUS COVID19: NORMAL
SARS-COV-2 RNA RESP QL NAA+PROBE: DETECTED
SPECIMEN SOURCE: ABNORMAL

## 2021-08-05 ENCOUNTER — APPOINTMENT (OUTPATIENT)
Dept: MEDICAL GROUP | Facility: LAB | Age: 51
End: 2021-08-05
Payer: COMMERCIAL

## 2021-08-11 ENCOUNTER — OFFICE VISIT (OUTPATIENT)
Dept: MEDICAL GROUP | Facility: LAB | Age: 51
End: 2021-08-11
Payer: COMMERCIAL

## 2021-08-11 ENCOUNTER — HOSPITAL ENCOUNTER (OUTPATIENT)
Facility: MEDICAL CENTER | Age: 51
End: 2021-08-11
Attending: FAMILY MEDICINE
Payer: COMMERCIAL

## 2021-08-11 VITALS
BODY MASS INDEX: 23.49 KG/M2 | SYSTOLIC BLOOD PRESSURE: 100 MMHG | DIASTOLIC BLOOD PRESSURE: 70 MMHG | TEMPERATURE: 98.9 F | WEIGHT: 141 LBS | OXYGEN SATURATION: 97 % | RESPIRATION RATE: 16 BRPM | HEART RATE: 88 BPM | HEIGHT: 65 IN

## 2021-08-11 DIAGNOSIS — J40 SINOBRONCHITIS: ICD-10-CM

## 2021-08-11 DIAGNOSIS — J32.9 SINOBRONCHITIS: ICD-10-CM

## 2021-08-11 DIAGNOSIS — Z13.0 SCREENING FOR DEFICIENCY ANEMIA: ICD-10-CM

## 2021-08-11 DIAGNOSIS — Z86.16 HISTORY OF COVID-19: ICD-10-CM

## 2021-08-11 DIAGNOSIS — Z13.29 SCREENING FOR THYROID DISORDER: ICD-10-CM

## 2021-08-11 DIAGNOSIS — Z13.1 SCREENING FOR DIABETES MELLITUS: ICD-10-CM

## 2021-08-11 DIAGNOSIS — Z13.21 ENCOUNTER FOR VITAMIN DEFICIENCY SCREENING: ICD-10-CM

## 2021-08-11 DIAGNOSIS — Z13.220 SCREENING FOR HYPERLIPIDEMIA: ICD-10-CM

## 2021-08-11 PROBLEM — Z79.811 AROMATASE INHIBITOR USE: Status: ACTIVE | Noted: 2020-03-06

## 2021-08-11 PROBLEM — C50.412 MALIGNANT NEOPLASM OF UPPER-OUTER QUADRANT OF LEFT BREAST IN FEMALE, ESTROGEN RECEPTOR POSITIVE (HCC): Status: ACTIVE | Noted: 2017-05-11

## 2021-08-11 PROBLEM — Z17.0 MALIGNANT NEOPLASM OF UPPER-OUTER QUADRANT OF LEFT BREAST IN FEMALE, ESTROGEN RECEPTOR POSITIVE (HCC): Status: ACTIVE | Noted: 2017-05-11

## 2021-08-11 PROCEDURE — U0005 INFEC AGEN DETEC AMPLI PROBE: HCPCS

## 2021-08-11 PROCEDURE — U0003 INFECTIOUS AGENT DETECTION BY NUCLEIC ACID (DNA OR RNA); SEVERE ACUTE RESPIRATORY SYNDROME CORONAVIRUS 2 (SARS-COV-2) (CORONAVIRUS DISEASE [COVID-19]), AMPLIFIED PROBE TECHNIQUE, MAKING USE OF HIGH THROUGHPUT TECHNOLOGIES AS DESCRIBED BY CMS-2020-01-R: HCPCS

## 2021-08-11 PROCEDURE — 99214 OFFICE O/P EST MOD 30 MIN: CPT | Mod: CS | Performed by: FAMILY MEDICINE

## 2021-08-11 RX ORDER — ANASTROZOLE 1 MG/1
1 TABLET ORAL
COMMUNITY
Start: 2021-07-07

## 2021-08-11 RX ORDER — DOXYCYCLINE HYCLATE 100 MG
100 TABLET ORAL 2 TIMES DAILY
Qty: 20 TABLET | Refills: 0 | Status: SHIPPED | OUTPATIENT
Start: 2021-08-11

## 2021-08-12 DIAGNOSIS — J32.9 SINOBRONCHITIS: ICD-10-CM

## 2021-08-12 DIAGNOSIS — J40 SINOBRONCHITIS: ICD-10-CM

## 2021-08-12 LAB
COVID ORDER STATUS COVID19: NORMAL
SARS-COV-2 RNA RESP QL NAA+PROBE: NOTDETECTED
SPECIMEN SOURCE: NORMAL

## 2021-08-18 NOTE — PROGRESS NOTES
Subjective:     Chief Complaint   Patient presents with   • Cough      x3-4 weeks    • Sinus Problem     shortness of breath       Shira Guzman is a 50 y.o. female here today for evaluation and management of:  Patient has not been seen in 2-1/2 years.    Sinobronchitis  Illness: 4 weeks of illness including: nasal congestion, sore throat, cough ,  Sinus pain and pressure: bilateral frontal  Symptoms negative for fever, chills,   Treatments tried: none   Since onset, symptoms are same   Similarly ill exposures: no  Medical history negative for asthma  She  reports that she quit smoking about 7 years ago. Her smoking use included cigarettes. She has a 0.05 pack-year smoking history. She has never used smokeless tobacco.    Patient did have Covid in November 2020.    Allergies   Allergen Reactions   • Bee Venom Anaphylaxis   • Latex Hives   • Cillins [Penicillins] Hives   • Codeine Nausea and Unspecified       Current medicines (including changes today)  Current Outpatient Medications   Medication Sig Dispense Refill   • anastrozole (ARIMIDEX) 1 MG Tab Take 1 Tablet by mouth every day.     • doxycycline (VIBRAMYCIN) 100 MG Tab Take 1 Tablet by mouth 2 times a day. 20 Tablet 0   • vitamin D (CHOLECALCIFEROL) 1000 Unit (25 mcg) Tab Take 1,000 Units by mouth every day.     • magnesium oxide (MAG-OX) 400 MG Tab tablet Take 400 mg by mouth every day.     •  MG Tab TAKE 1 TABLET BY MOUTH EVERY 8 HOURS AS NEEDED 90 Tab 1   • anastrozole (ARIMIDEX) 1 MG Tab Take 1 mg by mouth every day.       No current facility-administered medications for this visit.       She  has a past medical history of Anxiety, Asthma, Cancer (HCC), and PCOS (polycystic ovarian syndrome).    Patient Active Problem List    Diagnosis Date Noted   • History of COVID-19 08/11/2021   • Sinobronchitis 08/11/2021   • Aromatase inhibitor use 03/06/2020   • Rosacea 12/20/2018   • Decreased ROM of neck 09/05/2018   • Current moderate episode of  "major depressive disorder without prior episode ([facility]) 05/29/2018   • Joint pain 11/09/2017   • Family history of autoimmune disorder 11/09/2017   • S/P mastectomy, bilateral 10/16/2017   • Malignant neoplasm of left female breast (HCC) 06/01/2017   • Malignant neoplasm of upper-outer quadrant of left breast in female, estrogen receptor positive (HCC) 05/11/2017   • Cyst of left breast 11/05/2015   • Cervical myofascial strain 10/01/2015   • Breast lump 10/01/2015   • Generalized anxiety disorder 06/03/2015   • PCOS (polycystic ovarian syndrome)        ROS   No fever or chills.  No nausea or vomiting.  No chest pain or palpitations.  No pain with urination or hematuria.  No black or bloody stools.       Objective:     /70 (BP Location: Right arm, Patient Position: Sitting, BP Cuff Size: Adult)   Pulse 88   Temp 37.2 °C (98.9 °F) (Temporal)   Resp 16   Ht 1.651 m (5' 5\")   Wt 64 kg (141 lb)   SpO2 97%  Body mass index is 23.46 kg/m².   Physical Exam:  Well developed, well nourished.  Alert, oriented in no acute distress.  Eye contact is good, speech goal directed, affect calm  Eyes: conjunctiva non-injected, sclera non-icteric.  Ears: Pinna normal. TM pearly gray.   Nose: Nares are patent.  Erythematous, swollen mucosa  Mouth: Oral mucous membranes pink and moist with no lesions.  Neck Supple.  No adenopathy or masses in the neck or supraclavicular regions. No thyromegaly  Lungs coarse rhonchi throughout but good air movement.  No retractions.  No crackles heard  CV: regular rate and rhythm. No murmur          Assessment and Plan:   The following treatment plan was discussed    1. History of COVID-19  Check chest x-ray given patient's history and her symptoms.  - DX-CHEST-2 VIEWS; Future    2. Sinobronchitis  This is a new problem check chest x-ray given patient's history and her symptoms.  If she did have the delta variant of Covid she now has a secondary infection.  We will start her on doxycycline as " directed.  Chest x-ray to assess.  Covid test obtained.  Patient should self isolate until we have the results.  - doxycycline (VIBRAMYCIN) 100 MG Tab; Take 1 Tablet by mouth 2 times a day.  Dispense: 20 Tablet; Refill: 0  - DX-CHEST-2 VIEWS; Future  - COVID/SARS CoV-2 PCR; Future    3. Screening for deficiency anemia  Screening labs ordered.  Await results for counseling.    - CBC WITH DIFFERENTIAL; Future    4. Screening for diabetes mellitus  Screening labs ordered.  Await results for counseling.    - Comp Metabolic Panel; Future    5. Screening for hyperlipidemia  Screening labs ordered.  Await results for counseling.    - Lipid Profile; Future    6. Screening for thyroid disorder  Screening labs ordered.  Await results for counseling.    - TSH; Future    7. Encounter for vitamin deficiency screening  Screening labs ordered.  Await results for counseling.    - VITAMIN D,25 HYDROXY; Future    Any change or worsening of signs or symptoms, patient encouraged to follow-up or report to the emergency room for further evaluation. Patient understands and agrees.    Followup: Return in about 3 months (around 11/11/2021).

## 2021-08-18 NOTE — ASSESSMENT & PLAN NOTE
Illness: 4 weeks of illness including: nasal congestion, sore throat, cough ,  Sinus pain and pressure: bilateral frontal  Symptoms negative for fever, chills,   Treatments tried: none   Since onset, symptoms are same   Similarly ill exposures: no  Medical history negative for asthma  She  reports that she quit smoking about 7 years ago. Her smoking use included cigarettes. She has a 0.05 pack-year smoking history. She has never used smokeless tobacco.

## 2021-09-30 ENCOUNTER — HOSPITAL ENCOUNTER (OUTPATIENT)
Facility: MEDICAL CENTER | Age: 51
End: 2021-09-30
Attending: FAMILY MEDICINE
Payer: COMMERCIAL

## 2021-09-30 ENCOUNTER — TELEMEDICINE (OUTPATIENT)
Dept: MEDICAL GROUP | Facility: LAB | Age: 51
End: 2021-09-30
Payer: COMMERCIAL

## 2021-09-30 VITALS — OXYGEN SATURATION: 94 % | BODY MASS INDEX: 23.32 KG/M2 | HEIGHT: 65 IN | WEIGHT: 140 LBS | HEART RATE: 95 BPM

## 2021-09-30 DIAGNOSIS — Z17.0 MALIGNANT NEOPLASM OF LEFT BREAST IN FEMALE, ESTROGEN RECEPTOR POSITIVE, UNSPECIFIED SITE OF BREAST (HCC): ICD-10-CM

## 2021-09-30 DIAGNOSIS — J06.9 UPPER RESPIRATORY TRACT INFECTION, UNSPECIFIED TYPE: ICD-10-CM

## 2021-09-30 DIAGNOSIS — C50.912 MALIGNANT NEOPLASM OF LEFT BREAST IN FEMALE, ESTROGEN RECEPTOR POSITIVE, UNSPECIFIED SITE OF BREAST (HCC): ICD-10-CM

## 2021-09-30 DIAGNOSIS — Z86.16 HISTORY OF COVID-19: ICD-10-CM

## 2021-09-30 PROBLEM — J32.9 SINOBRONCHITIS: Status: RESOLVED | Noted: 2021-08-11 | Resolved: 2021-09-30

## 2021-09-30 PROBLEM — J40 SINOBRONCHITIS: Status: RESOLVED | Noted: 2021-08-11 | Resolved: 2021-09-30

## 2021-09-30 PROCEDURE — U0003 INFECTIOUS AGENT DETECTION BY NUCLEIC ACID (DNA OR RNA); SEVERE ACUTE RESPIRATORY SYNDROME CORONAVIRUS 2 (SARS-COV-2) (CORONAVIRUS DISEASE [COVID-19]), AMPLIFIED PROBE TECHNIQUE, MAKING USE OF HIGH THROUGHPUT TECHNOLOGIES AS DESCRIBED BY CMS-2020-01-R: HCPCS

## 2021-09-30 PROCEDURE — U0005 INFEC AGEN DETEC AMPLI PROBE: HCPCS

## 2021-09-30 PROCEDURE — 99214 OFFICE O/P EST MOD 30 MIN: CPT | Mod: 95,CS | Performed by: FAMILY MEDICINE

## 2021-09-30 RX ORDER — TAMOXIFEN CITRATE 10 MG/1
TABLET ORAL DAILY
COMMUNITY

## 2021-09-30 NOTE — PROGRESS NOTES
Virtual Visit: Established Patient   This visit was conducted via Zoom using secure and encrypted videoconferencing technology.   The patient was in a private location in the state of [place].    The patient's identity was confirmed and verbal consent was obtained for this virtual visit.    Subjective:   CC:   Chief Complaint   Patient presents with   • Cough   • Generalized Body Aches   • Sinus Problem       Shira Guzman is a 50 y.o. female presenting for evaluation and management of:    Illness: 2 days of illness including: nasal congestion, green/purulent rhinorrhea, sore throat, cough, myalgias ,  Sinus pain and pressure: bilateral frontal  Symptoms negative for fever, chills,   Treatments tried: none   Since onset, symptoms are worse   Similarly ill exposures: no  Medical history negative for asthma  She  reports that she quit smoking about 7 years ago. Her smoking use included cigarettes. She has a 0.05 pack-year smoking history. She has never used smokeless tobacco.  Patient has not been vaccinated for Covid.  She did have Covid last November  Back on Tamoxifen    ROS   See HPI    Current medicines (including changes today)  Current Outpatient Medications   Medication Sig Dispense Refill   • anastrozole (ARIMIDEX) 1 MG Tab Take 1 Tablet by mouth every day.     • doxycycline (VIBRAMYCIN) 100 MG Tab Take 1 Tablet by mouth 2 times a day. 20 Tablet 0   • anastrozole (ARIMIDEX) 1 MG Tab Take 1 mg by mouth every day.     • vitamin D (CHOLECALCIFEROL) 1000 Unit (25 mcg) Tab Take 1,000 Units by mouth every day.     • magnesium oxide (MAG-OX) 400 MG Tab tablet Take 400 mg by mouth every day.     •  MG Tab TAKE 1 TABLET BY MOUTH EVERY 8 HOURS AS NEEDED 90 Tab 1     No current facility-administered medications for this visit.       Patient Active Problem List    Diagnosis Date Noted   • History of COVID-19 08/11/2021   • Aromatase inhibitor use 03/06/2020   • Rosacea 12/20/2018   • Decreased ROM of neck  "09/05/2018   • Current moderate episode of major depressive disorder without prior episode (HCC) 05/29/2018   • Joint pain 11/09/2017   • Family history of autoimmune disorder 11/09/2017   • S/P mastectomy, bilateral 10/16/2017   • Malignant neoplasm of left female breast (HCC) 06/01/2017   • Malignant neoplasm of upper-outer quadrant of left breast in female, estrogen receptor positive (HCC) 05/11/2017   • Cyst of left breast 11/05/2015   • Cervical myofascial strain 10/01/2015   • Generalized anxiety disorder 06/03/2015   • PCOS (polycystic ovarian syndrome)         Objective:   Pulse 95   Ht 1.651 m (5' 5\")   Wt 63.5 kg (140 lb)   SpO2 94%   BMI 23.30 kg/m²     Physical Exam:  Constitutional: Alert, no distress, well-groomed.  Skin: No rashes in visible areas.  Eye: Round. Conjunctiva clear, lids normal. No icterus.   ENMT: Lips pink without lesions, good dentition, moist mucous membranes. Phonation normal.  Neck: No masses, no thyromegaly. Moves freely without pain.  Respiratory: Unlabored respiratory effort, no cough or audible wheeze  Psych: Alert and oriented x3, normal affect and mood.     Assessment and Plan:   The following treatment plan was discussed:     1. Upper respiratory tract infection, unspecified type  - COVID/SARS CoV-2 PCR; Future    2. History of COVID-19    3. Malignant neoplasm of left breast in female, estrogen receptor positive, unspecified site of breast (HCC)  Significant concern for Covid patient is very high risk given her cancer diagnoses.  If she is positive I would recommend monoclonal antibodies, casirivimab 600 mg and imdevimab 600 mg subcutaneously with the latest date of administration of 10/7/2021 to be in the 10-day window.  I have provided the patient with the fax sheets for patient and parents/caregivers.  I have informed her of therapeutic alternatives these medications.  I have given her the option to accept or refuse the monoclonal antibodies.  Casirivimab and " Imdevimab do not have full FDA approval but have emergency use authorization by the FDA.  If patient develops increasing respiratory distress she is to go to the emergency room immediately.  Symptomatic care including increase fluids and rest discussed.    Follow-up: Return if symptoms worsen or fail to improve.

## 2021-09-30 NOTE — PATIENT INSTRUCTIONS
COVID-19  COVID-19 is a respiratory infection that is caused by a virus called severe acute respiratory syndrome coronavirus 2 (SARS-CoV-2). The disease is also known as coronavirus disease or novel coronavirus. In some people, the virus may not cause any symptoms. In others, it may cause a serious infection. The infection can get worse quickly and can lead to complications, such as:  · Pneumonia, or infection of the lungs.  · Acute respiratory distress syndrome or ARDS. This is fluid build-up in the lungs.  · Acute respiratory failure. This is a condition in which there is not enough oxygen passing from the lungs to the body.  · Sepsis or septic shock. This is a serious bodily reaction to an infection.  · Blood clotting problems.  · Secondary infections due to bacteria or fungus.  The virus that causes COVID-19 is contagious. This means that it can spread from person to person through droplets from coughs and sneezes (respiratory secretions).  What are the causes?  This illness is caused by a virus. You may catch the virus by:  · Breathing in droplets from an infected person's cough or sneeze.  · Touching something, like a table or a doorknob, that was exposed to the virus (contaminated) and then touching your mouth, nose, or eyes.  What increases the risk?  Risk for infection  You are more likely to be infected with this virus if you:  · Live in or travel to an area with a COVID-19 outbreak.  · Come in contact with a sick person who recently traveled to an area with a COVID-19 outbreak.  · Provide care for or live with a person who is infected with COVID-19.  Risk for serious illness  You are more likely to become seriously ill from the virus if you:  · Are 65 years of age or older.  · Have a long-term disease that lowers your body's ability to fight infection (immunocompromised).  · Live in a nursing home or long-term care facility.  · Have a long-term (chronic) disease such as:  ? Chronic lung disease, including  chronic obstructive pulmonary disease or asthma  ? Heart disease.  ? Diabetes.  ? Chronic kidney disease.  ? Liver disease.  · Are obese.  What are the signs or symptoms?  Symptoms of this condition can range from mild to severe. Symptoms may appear any time from 2 to 14 days after being exposed to the virus. They include:  · A fever.  · A cough.  · Difficulty breathing.  · Chills.  · Muscle pains.  · A sore throat.  · Loss of taste or smell.  Some people may also have stomach problems, such as nausea, vomiting, or diarrhea.  Other people may not have any symptoms of COVID-19.  How is this diagnosed?  This condition may be diagnosed based on:  · Your signs and symptoms, especially if:  ? You live in an area with a COVID-19 outbreak.  ? You recently traveled to or from an area where the virus is common.  ? You provide care for or live with a person who was diagnosed with COVID-19.  · A physical exam.  · Lab tests, which may include:  ? A nasal swab to take a sample of fluid from your nose.  ? A throat swab to take a sample of fluid from your throat.  ? A sample of mucus from your lungs (sputum).  ? Blood tests.  · Imaging tests, which may include, X-rays, CT scan, or ultrasound.  How is this treated?  At present, there is no medicine to treat COVID-19. Medicines that treat other diseases are being used on a trial basis to see if they are effective against COVID-19.  Your health care provider will talk with you about ways to treat your symptoms. For most people, the infection is mild and can be managed at home with rest, fluids, and over-the-counter medicines.  Treatment for a serious infection usually takes places in a hospital intensive care unit (ICU). It may include one or more of the following treatments. These treatments are given until your symptoms improve.  · Receiving fluids and medicines through an IV.  · Supplemental oxygen. Extra oxygen is given through a tube in the nose, a face mask, or a  foley.  · Positioning you to lie on your stomach (prone position). This makes it easier for oxygen to get into the lungs.  · Continuous positive airway pressure (CPAP) or bi-level positive airway pressure (BPAP) machine. This treatment uses mild air pressure to keep the airways open. A tube that is connected to a motor delivers oxygen to the body.  · Ventilator. This treatment moves air into and out of the lungs by using a tube that is placed in your windpipe.  · Tracheostomy. This is a procedure to create a hole in the neck so that a breathing tube can be inserted.  · Extracorporeal membrane oxygenation (ECMO). This procedure gives the lungs a chance to recover by taking over the functions of the heart and lungs. It supplies oxygen to the body and removes carbon dioxide.  Follow these instructions at home:  Lifestyle  · If you are sick, stay home except to get medical care. Your health care provider will tell you how long to stay home. Call your health care provider before you go for medical care.  · Rest at home as told by your health care provider.  · Do not use any products that contain nicotine or tobacco, such as cigarettes, e-cigarettes, and chewing tobacco. If you need help quitting, ask your health care provider.  · Return to your normal activities as told by your health care provider. Ask your health care provider what activities are safe for you.  General instructions  · Take over-the-counter and prescription medicines only as told by your health care provider.  · Drink enough fluid to keep your urine pale yellow.  · Keep all follow-up visits as told by your health care provider. This is important.  How is this prevented?    There is no vaccine to help prevent COVID-19 infection. However, there are steps you can take to protect yourself and others from this virus.  To protect yourself:   · Do not travel to areas where COVID-19 is a risk. The areas where COVID-19 is reported change often. To identify  high-risk areas and travel restrictions, check the [facility] travel website: wwwnc.cdc.gov/travel/notices  · If you live in, or must travel to, an area where COVID-19 is a risk, take precautions to avoid infection.  ? Stay away from people who are sick.  ? Wash your hands often with soap and water for 20 seconds. If soap and water are not available, use an alcohol-based hand .  ? Avoid touching your mouth, face, eyes, or nose.  ? Avoid going out in public, follow guidance from your state and local health authorities.  ? If you must go out in public, wear a cloth face covering or face mask.  ? Disinfect objects and surfaces that are frequently touched every day. This may include:  § Counters and tables.  § Doorknobs and light switches.  § Sinks and faucets.  § Electronics, such as phones, remote controls, keyboards, computers, and tablets.  To protect others:  If you have symptoms of COVID-19, take steps to prevent the virus from spreading to others.  · If you think you have a COVID-19 infection, contact your health care provider right away. Tell your health care team that you think you may have a COVID-19 infection.  · Stay home. Leave your house only to seek medical care. Do not use public transport.  · Do not travel while you are sick.  · Wash your hands often with soap and water for 20 seconds. If soap and water are not available, use alcohol-based hand .  · Stay away from other members of your household. Let healthy household members care for children and pets, if possible. If you have to care for children or pets, wash your hands often and wear a mask. If possible, stay in your own room, separate from others. Use a different bathroom.  · Make sure that all people in your household wash their hands well and often.  · Cough or sneeze into a tissue or your sleeve or elbow. Do not cough or sneeze into your hand or into the air.  · Wear a cloth face covering or face mask.  Where to find more  information  · Centers for Disease Control and Prevention: www.cdc.gov/coronavirus/2019-ncov/index.html  · World Health Organization: www.who.int/health-topics/coronavirus  Contact a health care provider if:  · You live in or have traveled to an area where COVID-19 is a risk and you have symptoms of the infection.  · You have had contact with someone who has COVID-19 and you have symptoms of the infection.  Get help right away if:  · You have trouble breathing.  · You have pain or pressure in your chest.  · You have confusion.  · You have bluish lips and fingernails.  · You have difficulty waking from sleep.  · You have symptoms that get worse.  These symptoms may represent a serious problem that is an emergency. Do not wait to see if the symptoms will go away. Get medical help right away. Call your local emergency services (911 in the [place] Do not drive yourself to the hospital. Let the emergency medical personnel know if you think you have COVID-19.  Summary  · COVID-19 is a respiratory infection that is caused by a virus. It is also known as coronavirus disease or novel coronavirus. It can cause serious infections, such as pneumonia, acute respiratory distress syndrome, acute respiratory failure, or sepsis.  · The virus that causes COVID-19 is contagious. This means that it can spread from person to person through droplets from coughs and sneezes.  · You are more likely to develop a serious illness if you are 65 years of age or older, have a weak immunity, live in a nursing home, or have chronic disease.  · There is no medicine to treat COVID-19. Your health care provider will talk with you about ways to treat your symptoms.  · Take steps to protect yourself and others from infection. Wash your hands often and disinfect objects and surfaces that are frequently touched every day. Stay away from people who are sick and wear a mask if you are sick.  This information is not intended to replace advice given to you by  your health care provider. Make sure you discuss any questions you have with your health care provider.  Document Released: 01/23/2020 Document Revised: 05/14/2020 Document Reviewed: 01/23/2020  Elsevier Patient Education © 2020 [employer]

## 2021-10-01 DIAGNOSIS — J06.9 UPPER RESPIRATORY TRACT INFECTION, UNSPECIFIED TYPE: ICD-10-CM

## 2022-04-19 ENCOUNTER — TELEPHONE (OUTPATIENT)
Dept: MEDICAL GROUP | Facility: LAB | Age: 52
End: 2022-04-19
Payer: COMMERCIAL

## 2022-04-19 NOTE — TELEPHONE ENCOUNTER
"· surgical follow up paperwork received from [facility] requiring provider signature.     · All appropriate fields completed by Medical Assistant: N/A CMA printed and distributed to MA    · Paperwork placed in \"MA to Provider\" folder/basket. Awaiting provider completion/signature.  "

## 2022-06-24 ENCOUNTER — OFFICE VISIT (OUTPATIENT)
Dept: MEDICAL GROUP | Facility: LAB | Age: 52
End: 2022-06-24
Payer: COMMERCIAL

## 2022-06-24 VITALS
HEART RATE: 98 BPM | DIASTOLIC BLOOD PRESSURE: 60 MMHG | WEIGHT: 146 LBS | OXYGEN SATURATION: 98 % | TEMPERATURE: 97.4 F | BODY MASS INDEX: 24.32 KG/M2 | HEIGHT: 65 IN | RESPIRATION RATE: 16 BRPM | SYSTOLIC BLOOD PRESSURE: 100 MMHG

## 2022-06-24 DIAGNOSIS — F41.9 ANXIETY: ICD-10-CM

## 2022-06-24 DIAGNOSIS — Z12.11 COLON CANCER SCREENING: ICD-10-CM

## 2022-06-24 DIAGNOSIS — Z13.220 SCREENING FOR HYPERLIPIDEMIA: ICD-10-CM

## 2022-06-24 DIAGNOSIS — R53.83 OTHER FATIGUE: ICD-10-CM

## 2022-06-24 PROCEDURE — 99214 OFFICE O/P EST MOD 30 MIN: CPT | Performed by: FAMILY MEDICINE

## 2022-06-24 RX ORDER — VENLAFAXINE HYDROCHLORIDE 37.5 MG/1
37.5 CAPSULE, EXTENDED RELEASE ORAL DAILY
Qty: 30 CAPSULE | Refills: 3 | Status: SHIPPED | OUTPATIENT
Start: 2022-06-24

## 2022-06-24 ASSESSMENT — ANXIETY QUESTIONNAIRES
1. FEELING NERVOUS, ANXIOUS, OR ON EDGE: NEARLY EVERY DAY
GAD7 TOTAL SCORE: 21
3. WORRYING TOO MUCH ABOUT DIFFERENT THINGS: NEARLY EVERY DAY
7. FEELING AFRAID AS IF SOMETHING AWFUL MIGHT HAPPEN: NEARLY EVERY DAY
6. BECOMING EASILY ANNOYED OR IRRITABLE: NEARLY EVERY DAY
2. NOT BEING ABLE TO STOP OR CONTROL WORRYING: NEARLY EVERY DAY
4. TROUBLE RELAXING: NEARLY EVERY DAY
5. BEING SO RESTLESS THAT IT IS HARD TO SIT STILL: NEARLY EVERY DAY

## 2022-06-24 ASSESSMENT — PATIENT HEALTH QUESTIONNAIRE - PHQ9
CLINICAL INTERPRETATION OF PHQ2 SCORE: 6
5. POOR APPETITE OR OVEREATING: 3 - NEARLY EVERY DAY
SUM OF ALL RESPONSES TO PHQ QUESTIONS 1-9: 25

## 2022-06-24 NOTE — PROGRESS NOTES
Chief Complaint:   Chief Complaint   Patient presents with   • Anxiety       HPI: Established patient, new to me  Shira Guzman is a 51 y.o. female who presents for evaluation of anxiety, discussed the following today:    1. Anxiety  Chronic ongoing, new to me  Patient said in the past she tried several SSRIs, but she stopped using medication for long time, recently for the past few months she has been feeling that her anxiety is not well controlled, she is unable to control it with her relaxation techniques and coping techniques that she used to be able to control it with.  Denies intentions to harm self or others.  No recent lab work to rule out thyroid disease.  Reports also a lot of tiredness and fatigue and concerns about weight.  2. Other fatigue  Chronic, lots of tiredness and fatigue, denies depression, reports a lot of anxiety    3. Colon cancer screening  Due for colon cancer screening, no changes in bowel habits or abdominal pain or blood in stool    4. Screening for hyperlipidemia  Patient is due for labs          Past medical history, family history, social history and medications reviewed and updated in the record.  Today  Current medications, problem list and allergies reviewed in [facility] today  Health maintenance topics are reviewed and updated.    Patient Active Problem List    Diagnosis Date Noted   • History of COVID-19 08/11/2021   • Aromatase inhibitor use 03/06/2020   • Rosacea 12/20/2018   • Decreased ROM of neck 09/05/2018   • Current moderate episode of major depressive disorder without prior episode (HCC) 05/29/2018   • Joint pain 11/09/2017   • Family history of autoimmune disorder 11/09/2017   • S/P mastectomy, bilateral 10/16/2017   • Malignant neoplasm of left female breast (HCC) 06/01/2017   • Malignant neoplasm of upper-outer quadrant of left breast in female, estrogen receptor positive (HCC) 05/11/2017   • Cyst of left breast 11/05/2015   • Cervical myofascial strain 10/01/2015    • Generalized anxiety disorder 2015   • PCOS (polycystic ovarian syndrome)      Family History   Problem Relation Age of Onset   • Arthritis Mother         psoriatic.   • Heart Disease Mother    • Diabetes Mother    • Genetic Disorder Mother         psoriatric arthritis   • Hypertension Father    • Hyperlipidemia Father      Social History     Socioeconomic History   • Marital status:      Spouse name: Not on file   • Number of children: Not on file   • Years of education: Not on file   • Highest education level: Not on file   Occupational History   • Not on file   Tobacco Use   • Smoking status: Former Smoker     Packs/day: 0.05     Years: 1.00     Pack years: 0.05     Types: Cigarettes     Quit date: 1/3/2014     Years since quittin.4   • Smokeless tobacco: Never Used   • Tobacco comment: 2 cig / day   Vaping Use   • Vaping Use: Never used   Substance and Sexual Activity   • Alcohol use: No     Alcohol/week: 0.0 oz   • Drug use: No   • Sexual activity: Yes     Partners: Male     Comment: , two children; stay home   Other Topics Concern   • Not on file   Social History Narrative   • Not on file     Social Determinants of Health     Financial Resource Strain: Not on file   Food Insecurity: Not on file   Transportation Needs: Not on file   Physical Activity: Not on file   Stress: Not on file   Social Connections: Not on file   Intimate Partner Violence: Not on file   Housing Stability: Not on file       Current Outpatient Medications   Medication Sig Dispense Refill   • venlafaxine XR (EFFEXOR XR) 37.5 MG CAPSULE SR 24 HR Take 1 Capsule by mouth every day. 30 Capsule 3   • tamoxifen (NOLVADEX) 10 MG Tab Take  by mouth every day.     • anastrozole (ARIMIDEX) 1 MG Tab Take 1 Tablet by mouth every day.     • doxycycline (VIBRAMYCIN) 100 MG Tab Take 1 Tablet by mouth 2 times a day. 20 Tablet 0   • anastrozole (ARIMIDEX) 1 MG Tab Take 1 mg by mouth every day.     • vitamin D (CHOLECALCIFEROL)  "1000 Unit (25 mcg) Tab Take 1,000 Units by mouth every day.     • magnesium oxide (MAG-OX) 400 MG Tab tablet Take 400 mg by mouth every day.     •  MG Tab TAKE 1 TABLET BY MOUTH EVERY 8 HOURS AS NEEDED 90 Tab 1     No current facility-administered medications for this visit.         Review Of Systems  As documented in HPI above  PHYSICAL EXAMINATION:    /60 (BP Location: Right arm, Patient Position: Sitting, BP Cuff Size: Adult)   Pulse 98   Temp 36.3 °C (97.4 °F) (Temporal)   Resp 16   Ht 1.651 m (5' 5\")   Wt 66.2 kg (146 lb)   LMP 10/28/2015   SpO2 98%   BMI 24.30 kg/m²   Gen.: Well-developed, well-nourished, no apparent distress, pleasant and cooperative with the examination  HEENT: Normocephalic/atraumatic,     Neck: No JVD or bruits, no adenopathy  Cor: Regular rate and rhythm without murmur gallop or rub  Lungs: Clear to auscultation with equal breath sounds bilaterally. No wheezes, rhonchi.  Abdomen: Soft nontender without hepatosplenomegaly or masses appreciated, normoactive bowel sounds  Extremities: No cyanosis, clubbing or edema          ASSESSMENT/Plan:  1. Anxiety   chronic problem, new to me, not well controlled recently.  Discussed with the patient to check thyroid function and rule out other medical causes, after doing her labs she can start low-dose of Effexor, and establish with behavioral health.  TSH    FREE THYROXINE    venlafaxine XR (EFFEXOR XR) 37.5 MG CAPSULE SR 24 HR    Referral to Behavioral Health   2. Other fatigue   new to me,  Will do work up to R/O DM, Anemia , Thyroid disease , Kidney disease    CBC WITH DIFFERENTIAL    Comp Metabolic Panel    TSH    FREE THYROXINE    CORTISOL - AM   3. Colon cancer screening  Referral to Gastroenterology   4. Screening for hyperlipidemia  Lipid Profile       Please note that this dictation was created using voice recognition software. I have made every reasonable attempt to correct obvious errors but there may be errors of " grammar and content that I may have overlooked prior to finalization of this note.

## 2022-09-30 ENCOUNTER — APPOINTMENT (OUTPATIENT)
Dept: MEDICAL GROUP | Facility: LAB | Age: 52
End: 2022-09-30
Payer: COMMERCIAL

## 2023-04-26 ENCOUNTER — TELEPHONE (OUTPATIENT)
Dept: PHYSICAL THERAPY | Facility: MEDICAL CENTER | Age: 53
End: 2023-04-26

## 2023-11-14 ENCOUNTER — OFFICE VISIT (OUTPATIENT)
Dept: URGENT CARE | Facility: CLINIC | Age: 53
End: 2023-11-14
Payer: COMMERCIAL

## 2023-11-14 VITALS
WEIGHT: 149 LBS | OXYGEN SATURATION: 96 % | DIASTOLIC BLOOD PRESSURE: 78 MMHG | TEMPERATURE: 98.1 F | BODY MASS INDEX: 24.83 KG/M2 | HEART RATE: 82 BPM | HEIGHT: 65 IN | RESPIRATION RATE: 16 BRPM | SYSTOLIC BLOOD PRESSURE: 122 MMHG

## 2023-11-14 DIAGNOSIS — J06.9 UPPER RESPIRATORY TRACT INFECTION, UNSPECIFIED TYPE: ICD-10-CM

## 2023-11-14 LAB
FLUAV RNA SPEC QL NAA+PROBE: NEGATIVE
FLUBV RNA SPEC QL NAA+PROBE: NEGATIVE
RSV RNA SPEC QL NAA+PROBE: NEGATIVE
SARS-COV-2 RNA RESP QL NAA+PROBE: NEGATIVE

## 2023-11-14 PROCEDURE — 99203 OFFICE O/P NEW LOW 30 MIN: CPT

## 2023-11-14 PROCEDURE — 0241U POCT CEPHEID COV-2, FLU A/B, RSV - PCR: CPT

## 2023-11-14 PROCEDURE — 3078F DIAST BP <80 MM HG: CPT

## 2023-11-14 PROCEDURE — 3074F SYST BP LT 130 MM HG: CPT

## 2023-11-14 NOTE — PROGRESS NOTES
"Chief Complaint   Patient presents with    Coronavirus Screening     EXPOSED TO COVID . Body aches ,headache. Need test to go back to work         Subjective:   HISTORY OF PRESENT ILLNESS: Shira Guzman is a 53 y.o. female who presents for a covid test, her  tested positive for COVID.  Pt has been headaches and body aches for over 1 week off and on.  Subjective fever   Patient denies chills, SOB, CP.  She is requesting a covid test, she understand she is out of her 5 day window    Medications, Allergies, current problem list, Social and Family history reviewed today in Epic.     Objective:     /78   Pulse 82   Temp 36.7 °C (98.1 °F) (Temporal)   Resp 16   Ht 1.651 m (5' 5\")   Wt 67.6 kg (149 lb)   SpO2 96%     Physical Exam  Vitals reviewed.   Constitutional:       Appearance: Normal appearance. She is not toxic-appearing.   HENT:      Head:      Jaw: No trismus.      Right Ear: Tympanic membrane normal.      Left Ear: Tympanic membrane normal.      Nose: Rhinorrhea present.      Mouth/Throat:      Mouth: Mucous membranes are moist.      Pharynx: Uvula midline. Posterior oropharyngeal erythema present. No pharyngeal swelling, oropharyngeal exudate or uvula swelling.      Tonsils: No tonsillar exudate or tonsillar abscesses. 1+ on the right. 1+ on the left.      Comments: No muffled voice, trismus, unilateral deviation of the uvula, soft palate fullness or edema. No oral airway compromise, or drooling noted.   Eyes:      Conjunctiva/sclera: Conjunctivae normal.   Cardiovascular:      Rate and Rhythm: Normal rate and regular rhythm.      Heart sounds: Normal heart sounds.   Pulmonary:      Effort: Pulmonary effort is normal. No respiratory distress.      Breath sounds: Normal breath sounds. No decreased breath sounds or wheezing.   Musculoskeletal:      Cervical back: Full passive range of motion without pain and neck supple.   Skin:     General: Skin is warm and dry.   Neurological:      " Mental Status: She is alert and oriented to person, place, and time.   Psychiatric:         Mood and Affect: Mood normal.          Assessment/Plan:     Diagnosis and associated orders    I personally reviewed prior external notes and test results pertinent to today's visit.     1. Upper respiratory tract infection, unspecified type  POCT CEPHEID COV-2, FLU A/B, RSV - PCR            IMPRESSION:  Exam findings reassuring with stable vital signs, No red flag symptoms or exam findings. Explained that S/S are consistent with a viral illness and are self limiting.  No antibiotics are indicated at this time.     OTC symptomatic relief measures were recommended.  Supportive care also discussed to include the use of warm salt water gargles, saline nasal rinses, steam inhalation, and the use of a cool-mist humidifier in the bedroom at night.      Differential diagnosis discussed. Pt was Educated on red flag symptoms. Pt has been Instructed to return to Urgent Care or nearest Emergency Department if symptoms fail to improve, for any change in condition, further concerns, or new concerning symptoms. Patient states understanding of the plan of care and discharge instructions.  They are discharged in stable condition.         Please note that this dictation was created using voice recognition software. I have made a reasonable attempt to correct obvious errors, but I expect that there are errors of grammar and possibly content that I did not discover before finalizing the note.    This note was electronically signed by NIXON Mercedes

## 2024-03-22 ENCOUNTER — OFFICE VISIT (OUTPATIENT)
Dept: URGENT CARE | Facility: CLINIC | Age: 54
End: 2024-03-22
Payer: COMMERCIAL

## 2024-03-22 VITALS
OXYGEN SATURATION: 99 % | SYSTOLIC BLOOD PRESSURE: 118 MMHG | TEMPERATURE: 97.8 F | DIASTOLIC BLOOD PRESSURE: 70 MMHG | HEIGHT: 65 IN | HEART RATE: 82 BPM | WEIGHT: 148 LBS | BODY MASS INDEX: 24.66 KG/M2 | RESPIRATION RATE: 16 BRPM

## 2024-03-22 DIAGNOSIS — L98.9 DERMATOSIS OF SCALP: ICD-10-CM

## 2024-03-22 RX ORDER — HYDROXYZINE HYDROCHLORIDE 25 MG/1
25 TABLET, FILM COATED ORAL
Qty: 30 TABLET | Refills: 1 | Status: SHIPPED | OUTPATIENT
Start: 2024-03-22 | End: 2024-04-21

## 2024-03-22 RX ORDER — CLOBETASOL PROPIONATE 0.05 G/100ML
SHAMPOO TOPICAL
Qty: 118 ML | Refills: 0 | Status: SHIPPED | OUTPATIENT
Start: 2024-03-22

## 2024-03-22 RX ORDER — DEXAMETHASONE SODIUM PHOSPHATE 10 MG/ML
10 INJECTION INTRAMUSCULAR; INTRAVENOUS ONCE
Status: COMPLETED | OUTPATIENT
Start: 2024-03-22 | End: 2024-03-22

## 2024-03-22 RX ORDER — PREDNISONE 10 MG/1
40 TABLET ORAL DAILY
Qty: 20 TABLET | Refills: 0 | Status: SHIPPED | OUTPATIENT
Start: 2024-03-22 | End: 2024-03-27

## 2024-03-22 RX ADMIN — DEXAMETHASONE SODIUM PHOSPHATE 10 MG: 10 INJECTION INTRAMUSCULAR; INTRAVENOUS at 19:12

## 2024-03-23 ASSESSMENT — ENCOUNTER SYMPTOMS
SHORTNESS OF BREATH: 0
NAUSEA: 0
DIARRHEA: 0
HEADACHES: 0
FEVER: 0
CONSTIPATION: 0
MYALGIAS: 0
EYE PAIN: 0
ABDOMINAL PAIN: 0
SORE THROAT: 0
CHILLS: 0
COUGH: 0
VOMITING: 0

## 2024-05-10 ENCOUNTER — OFFICE VISIT (OUTPATIENT)
Dept: MEDICAL GROUP | Facility: LAB | Age: 54
End: 2024-05-10
Payer: COMMERCIAL

## 2024-05-10 VITALS
TEMPERATURE: 98.2 F | HEIGHT: 65 IN | DIASTOLIC BLOOD PRESSURE: 70 MMHG | WEIGHT: 147 LBS | BODY MASS INDEX: 24.49 KG/M2 | OXYGEN SATURATION: 96 % | RESPIRATION RATE: 16 BRPM | SYSTOLIC BLOOD PRESSURE: 110 MMHG | HEART RATE: 80 BPM

## 2024-05-10 DIAGNOSIS — G89.29 CHRONIC NECK PAIN: ICD-10-CM

## 2024-05-10 DIAGNOSIS — M54.41 CHRONIC RIGHT-SIDED LOW BACK PAIN WITH RIGHT-SIDED SCIATICA: ICD-10-CM

## 2024-05-10 DIAGNOSIS — Z13.220 SCREENING FOR HYPERLIPIDEMIA: ICD-10-CM

## 2024-05-10 DIAGNOSIS — G89.29 CHRONIC RIGHT-SIDED LOW BACK PAIN WITH RIGHT-SIDED SCIATICA: ICD-10-CM

## 2024-05-10 DIAGNOSIS — M54.50 CHRONIC LOW BACK PAIN WITHOUT SCIATICA, UNSPECIFIED BACK PAIN LATERALITY: ICD-10-CM

## 2024-05-10 DIAGNOSIS — M54.2 CHRONIC NECK PAIN: ICD-10-CM

## 2024-05-10 DIAGNOSIS — R22.32 LUMP OF SKIN OF LEFT UPPER EXTREMITY: ICD-10-CM

## 2024-05-10 DIAGNOSIS — G89.29 CHRONIC LOW BACK PAIN WITHOUT SCIATICA, UNSPECIFIED BACK PAIN LATERALITY: ICD-10-CM

## 2024-05-10 DIAGNOSIS — Z13.29 SCREENING FOR THYROID DISORDER: ICD-10-CM

## 2024-05-10 DIAGNOSIS — M25.542 ARTHRALGIA OF LEFT HAND: ICD-10-CM

## 2024-05-10 DIAGNOSIS — R20.0 HAND NUMBNESS: ICD-10-CM

## 2024-05-10 PROCEDURE — 99214 OFFICE O/P EST MOD 30 MIN: CPT | Performed by: FAMILY MEDICINE

## 2024-05-10 PROCEDURE — 3078F DIAST BP <80 MM HG: CPT | Performed by: FAMILY MEDICINE

## 2024-05-10 PROCEDURE — 3074F SYST BP LT 130 MM HG: CPT | Performed by: FAMILY MEDICINE

## 2024-05-10 NOTE — PROGRESS NOTES
Chief Complaint   Patient presents with    Requesting Labs    Pain     Hand and wrist - back - neck     Bump     Under skin of left arm       Verbal consent was acquired by the patient to use [employer] ambient listening note generation during this visit Yes      HPI: Established patient, has not been having primary care provider for a long time, directed today to establish with a new provider.  Her PCP left.  Has not seen provider for long time.  History of Present Illness  The patient is a 53-year-old female who presents for evaluation of multiple medical concerns.    The patient has been experiencing circulatory issues subsequent to her double mastectomy for breast cancer. She has been receiving a lymphatic massage therapist due to a sensation of fullness and tingling in her arm, a condition confirmed by her therapist today.     Additionally, she has been experiencing intermittent issues with her middle finger, extending to the other tips of her fingers. Initially, she suspected a finger fracture due to decreased bone density due to her ongoing inhibitor therapy. She underwent pre and post-hormone therapy for her cancer. She reports pain and stiffness in her left hand, which has shown improvement over the past week. She is left-handed and reports pain upon touch, which persisted for several days. She has been applying lidocaine to the area, which provides some relief. Today, she reports weakness in her arm and hand, albeit less severe than before. She is seeking re-issuation of blood work, as she has not done so in the past year. She does not have a primary care provider, having previously consulted with Dr. Montanez. She also reports swelling, numbness, and tingling in her fingers, predominantly in her left hand, which radiates down her arm. She is aware of the possibility of carpal tunnel syndrome and has not tried using a brace.    The patient has a lump on her left forearm, which has been present for several  "years and has increased in size. She consulted her oncologist at [facility] regarding this, who performed a scan, which did not reveal any malignancy.    The patient reports chronic neck and back pain, which radiates to her right side. She describes a sensation of tightness in her back. She has not undergone physical therapy for this condition.    Supplemental Information  She has osteopenia in her neck, hips, and one of her ankles.   Her mother has rheumatoid arthritis and psoriatic arthritis.              Exam:     /70 (BP Location: Right arm, Patient Position: Sitting, BP Cuff Size: Adult)   Pulse 80   Temp 36.8 °C (98.2 °F) (Temporal)   Resp 16   Ht 1.651 m (5' 5\")   Wt 66.7 kg (147 lb)   LMP 10/28/2015   SpO2 96%   BMI 24.46 kg/m²   Gen.: Well-developed, well-nourished, no apparent distress, pleasant and cooperative with the examination  HEENT: Normocephalic/atraumatic, sinuses nontender with palpation, TMs clear, nares patent with pink mucosa and clear rhinorrhea, oropharynx clear  Neck: No JVD or bruits, no adenopathy  Cor: Regular rate and rhythm without murmur gallop or rub  Lungs: Clear to auscultation with equal breath sounds bilaterally. No wheezes, rhonchi.  Abdomen: Soft nontender without hepatosplenomegaly or masses appreciated, normoactive bowel sounds  Extremities: No cyanosis, clubbing or edema     Skin: Left forearm there is a small nodule under the skin that is firm in consistency, mildly tender on pressure, mobile.  Lump is 5 cm x 5 cm.      Assessment & Plan    1. Arthralgia of left hand  Pain swelling and stiffness in her left hand fingers, patient gives history her mother is diagnosed with psoriatic and rheumatoid arthritis, discussed autoimmune workup  In light of the patient's pain and stiffness, we will proceed with basic laboratory tests and autoimmune markers. Additionally, an x-ray will be ordered. An EMG study will be ordered due to the patient's tingling and numbness. The " patient has been advised to utilize a wrist hand brace for carpal tunnel syndrome, particularly at night.    - CBC WITH DIFFERENTIAL; Future  - ANTI-NUCLEAR ANTIBODY SERUM; Future  - Sed Rate; Future  - CRP QUANTITIVE (NON-CARDIAC); Future  - HEP C VIRUS ANTIBODY; Future  - RHEUMATOID ARTHRITIS FACTOR; Future    2. Chronic neck pain  Chronic ongoing no red flags, advised to do x-ray, placed referral for physical therapy.  - DX-CERVICAL SPINE-2 OR 3 VIEWS; Future    3. Hand numbness  New concern chronic ongoing for the patient, rule out carpal tunnel, benign neurological exam.  - Referral to Neurodiagnostics (EEG,EP,EMG/NCS/DBS)    4. Chronic low back pain without sciatica, unspecified back pain laterality  Will send patient for physical therapy and x-ray for further evaluation    5. Chronic right-sided low back pain with right-sided sciatica    - DX-LUMBAR SPINE-2 OR 3 VIEWS; Future    6. Screening for thyroid disorder    - TSH WITH REFLEX TO FT4; Future    7. Screening for hyperlipidemia    - Comp Metabolic Panel; Future  - Lipid Profile; Future    8. Lump of skin of left upper extremity  Given the patient's history of cancer, an ultrasound of the left forearm will be ordered to rule out a lipoma. Should the ultrasound reveal any abnormalities, a biopsy may be necessary for removal.    - US-EXTREMITY NON VASCULAR UNILATERAL LEFT; Future        Please note that this dictation was created using voice recognition software. I have made every reasonable attempt to correct obvious errors but there may be errors of grammar and content that I may have overlooked prior to finalization of this note.       Follow up with PCP

## 2024-05-13 ENCOUNTER — APPOINTMENT (OUTPATIENT)
Dept: RADIOLOGY | Facility: MEDICAL CENTER | Age: 54
End: 2024-05-13
Attending: FAMILY MEDICINE
Payer: COMMERCIAL

## 2024-05-14 ENCOUNTER — HOSPITAL ENCOUNTER (OUTPATIENT)
Dept: RADIOLOGY | Facility: MEDICAL CENTER | Age: 54
End: 2024-05-14
Attending: FAMILY MEDICINE
Payer: COMMERCIAL

## 2024-05-14 DIAGNOSIS — R22.32 LUMP OF SKIN OF LEFT UPPER EXTREMITY: ICD-10-CM

## 2024-05-14 DIAGNOSIS — G89.29 CHRONIC RIGHT-SIDED LOW BACK PAIN WITH RIGHT-SIDED SCIATICA: ICD-10-CM

## 2024-05-14 DIAGNOSIS — M54.41 CHRONIC RIGHT-SIDED LOW BACK PAIN WITH RIGHT-SIDED SCIATICA: ICD-10-CM

## 2024-05-14 DIAGNOSIS — G89.29 CHRONIC NECK PAIN: ICD-10-CM

## 2024-05-14 DIAGNOSIS — M54.2 CHRONIC NECK PAIN: ICD-10-CM

## 2024-05-14 DIAGNOSIS — M25.542 ARTHRALGIA OF LEFT HAND: ICD-10-CM

## 2024-05-22 LAB
ALBUMIN SERPL-MCNC: 4.7 G/DL (ref 3.8–4.9)
ALBUMIN/GLOB SERPL: 2.2 {RATIO} (ref 1.2–2.2)
ALP SERPL-CCNC: 41 IU/L (ref 44–121)
ALT SERPL-CCNC: 20 IU/L (ref 0–32)
ANA SER QL IF: NEGATIVE
AST SERPL-CCNC: 22 IU/L (ref 0–40)
BASOPHILS # BLD AUTO: 0.1 X10E3/UL (ref 0–0.2)
BASOPHILS NFR BLD AUTO: 1 %
BILIRUB SERPL-MCNC: 0.5 MG/DL (ref 0–1.2)
BUN SERPL-MCNC: 18 MG/DL (ref 6–24)
BUN/CREAT SERPL: 22 (ref 9–23)
CALCIUM SERPL-MCNC: 9.2 MG/DL (ref 8.7–10.2)
CHLORIDE SERPL-SCNC: 106 MMOL/L (ref 96–106)
CHOLEST SERPL-MCNC: 235 MG/DL (ref 100–199)
CO2 SERPL-SCNC: 21 MMOL/L (ref 20–29)
CREAT SERPL-MCNC: 0.83 MG/DL (ref 0.57–1)
CRP SERPL-MCNC: <1 MG/L (ref 0–10)
EGFRCR SERPLBLD CKD-EPI 2021: 84 ML/MIN/1.73
EOSINOPHIL # BLD AUTO: 0.5 X10E3/UL (ref 0–0.4)
EOSINOPHIL NFR BLD AUTO: 9 %
ERYTHROCYTE [DISTWIDTH] IN BLOOD BY AUTOMATED COUNT: 12.3 % (ref 11.7–15.4)
ERYTHROCYTE [SEDIMENTATION RATE] IN BLOOD BY WESTERGREN METHOD: 2 MM/HR (ref 0–40)
GLOBULIN SER CALC-MCNC: 2.1 G/DL (ref 1.5–4.5)
GLUCOSE SERPL-MCNC: 86 MG/DL (ref 70–99)
HCT VFR BLD AUTO: 41.9 % (ref 34–46.6)
HCV IGG SERPL QL IA: NON REACTIVE
HDLC SERPL-MCNC: 72 MG/DL
HGB BLD-MCNC: 14 G/DL (ref 11.1–15.9)
IMM GRANULOCYTES # BLD AUTO: 0 X10E3/UL (ref 0–0.1)
IMM GRANULOCYTES NFR BLD AUTO: 0 %
IMMATURE CELLS  115398: ABNORMAL
LABORATORY COMMENT REPORT: ABNORMAL
LDLC SERPL CALC-MCNC: 149 MG/DL (ref 0–99)
LYMPHOCYTES # BLD AUTO: 2.1 X10E3/UL (ref 0.7–3.1)
LYMPHOCYTES NFR BLD AUTO: 41 %
MCH RBC QN AUTO: 29.9 PG (ref 26.6–33)
MCHC RBC AUTO-ENTMCNC: 33.4 G/DL (ref 31.5–35.7)
MCV RBC AUTO: 90 FL (ref 79–97)
MONOCYTES # BLD AUTO: 0.4 X10E3/UL (ref 0.1–0.9)
MONOCYTES NFR BLD AUTO: 7 %
MORPHOLOGY BLD-IMP: ABNORMAL
NEUTROPHILS # BLD AUTO: 2.2 X10E3/UL (ref 1.4–7)
NEUTROPHILS NFR BLD AUTO: 42 %
NRBC BLD AUTO-RTO: ABNORMAL %
PLATELET # BLD AUTO: 273 X10E3/UL (ref 150–450)
POTASSIUM SERPL-SCNC: 4.4 MMOL/L (ref 3.5–5.2)
PROT SERPL-MCNC: 6.8 G/DL (ref 6–8.5)
RBC # BLD AUTO: 4.68 X10E6/UL (ref 3.77–5.28)
RHEUMATOID FACT SERPL-ACNC: <10 IU/ML
SODIUM SERPL-SCNC: 142 MMOL/L (ref 134–144)
TRIGL SERPL-MCNC: 80 MG/DL (ref 0–149)
TSH SERPL DL<=0.005 MIU/L-ACNC: 1.14 UIU/ML (ref 0.45–4.5)
VLDLC SERPL CALC-MCNC: 14 MG/DL (ref 5–40)
WBC # BLD AUTO: 5.3 X10E3/UL (ref 3.4–10.8)

## 2024-05-27 DIAGNOSIS — L98.9 DERMATOSIS OF SCALP: ICD-10-CM

## 2024-06-04 ENCOUNTER — OFFICE VISIT (OUTPATIENT)
Dept: MEDICAL GROUP | Facility: LAB | Age: 54
End: 2024-06-04
Payer: COMMERCIAL

## 2024-06-04 VITALS
DIASTOLIC BLOOD PRESSURE: 70 MMHG | WEIGHT: 149 LBS | TEMPERATURE: 98.2 F | RESPIRATION RATE: 16 BRPM | OXYGEN SATURATION: 96 % | HEIGHT: 65 IN | BODY MASS INDEX: 24.83 KG/M2 | SYSTOLIC BLOOD PRESSURE: 120 MMHG | HEART RATE: 80 BPM

## 2024-06-04 DIAGNOSIS — R22.32 LUMP OF SKIN OF UPPER EXTREMITY, LEFT: ICD-10-CM

## 2024-06-04 DIAGNOSIS — E78.5 HYPERLIPIDEMIA, UNSPECIFIED HYPERLIPIDEMIA TYPE: ICD-10-CM

## 2024-06-04 PROCEDURE — 3078F DIAST BP <80 MM HG: CPT | Performed by: FAMILY MEDICINE

## 2024-06-04 PROCEDURE — 99214 OFFICE O/P EST MOD 30 MIN: CPT | Performed by: FAMILY MEDICINE

## 2024-06-04 PROCEDURE — 3074F SYST BP LT 130 MM HG: CPT | Performed by: FAMILY MEDICINE

## 2024-06-04 ASSESSMENT — FIBROSIS 4 INDEX: FIB4 SCORE: 0.97

## 2024-06-06 NOTE — PROGRESS NOTES
"Chief Complaint   Patient presents with    Lab Results     And imaging       Verbal consent was acquired by the patient to use [employer] ambient listening note generation during this visit Yes      HPI:  History of Present Illness  The patient is a 53-year-old female who presents for a follow-up on all the imaging studies that she had some arthritis.    The patient presents with several lipomas on her left forearm, which have been causing discomfort and numbness. She has previously undergone lymph node removal on the left side. The lipomas have increased in size.    The patient is experiencing sporadic bone pain.  Patient has history of breast cancer status posttreatment, she was previously prescribed three immune inhibitors. The first inhibitor was administered as a pretreatment treatment, however, she did not respond well, leading to the switch to tamoxifen. Post-surgery, she was prescribed two inhibitors for a brief period due to a significant decrease in bone density.     The patient maintains a healthy diet and engages in regular physical activity, including thrice-weekly hiking.     She has a family history of rheumatoid arthritis and psoriatic arthritis in her mother.              Exam:     /70 (BP Location: Right arm, Patient Position: Sitting, BP Cuff Size: Adult)   Pulse 80   Temp 36.8 °C (98.2 °F) (Temporal)   Resp 16   Ht 1.651 m (5' 5\")   Wt 67.6 kg (149 lb)   LMP 10/28/2015   SpO2 96%   BMI 24.79 kg/m²   Gen.: Well-developed, well-nourished, no apparent distress, pleasant and cooperative with the examination  HEENT: Normocephalic/atraumatic, sinuses nontender with palpation, TMs clear, nares patent with pink mucosa and clear rhinorrhea, oropharynx clear  Neck: No JVD or bruits, no adenopathy  Cor: Regular rate and rhythm without murmur gallop or rub  Lungs: Clear to auscultation with equal breath sounds bilaterally. No wheezes, rhonchi.  Abdomen: Soft nontender without " hepatosplenomegaly or masses appreciated, normoactive bowel sounds  Extremities: No cyanosis, clubbing or edema     Assessment & Plan    1. Lump of skin of upper extremity, left  Most likely lipomas, advised to follow-up with general surgeon for further management, patient is symptomatic for discomfort and numbness in the area where she has the lump.  Discussed ultrasound results.  - Referral to General Surgery    2. Hyperlipidemia, unspecified hyperlipidemia type  Chronic,  Elevated cholesterol.  The patient's cholesterol levels are elevated. A calcium cardiac score test has been ordered.  - CT-CARDIAC SCORING; Future        3. Arthritis.  The patient has been advised to maintain an active lifestyle, engage in stretching exercises, and take Tylenol as needed for pain management.      Please note that this dictation was created using voice recognition software. I have made every reasonable attempt to correct obvious errors but there may be errors of grammar and content that I may have overlooked prior to finalization of this note.

## 2024-07-08 ENCOUNTER — HOSPITAL ENCOUNTER (OUTPATIENT)
Dept: RADIOLOGY | Facility: MEDICAL CENTER | Age: 54
End: 2024-07-08
Attending: FAMILY MEDICINE
Payer: COMMERCIAL

## 2024-07-08 DIAGNOSIS — E78.5 HYPERLIPIDEMIA, UNSPECIFIED HYPERLIPIDEMIA TYPE: ICD-10-CM

## 2024-07-08 PROCEDURE — 4410556 CT-CARDIAC SCORING (SELF PAY ONLY)

## 2024-07-12 DIAGNOSIS — L98.9 DERMATOSIS OF SCALP: ICD-10-CM

## 2024-07-29 ENCOUNTER — PHYSICAL THERAPY (OUTPATIENT)
Dept: PHYSICAL THERAPY | Facility: REHABILITATION | Age: 54
End: 2024-07-29
Attending: FAMILY MEDICINE
Payer: COMMERCIAL

## 2024-07-29 DIAGNOSIS — M54.50 CHRONIC LOW BACK PAIN WITHOUT SCIATICA, UNSPECIFIED BACK PAIN LATERALITY: ICD-10-CM

## 2024-07-29 DIAGNOSIS — G89.29 CHRONIC NECK PAIN: ICD-10-CM

## 2024-07-29 DIAGNOSIS — G89.29 CHRONIC LOW BACK PAIN WITHOUT SCIATICA, UNSPECIFIED BACK PAIN LATERALITY: ICD-10-CM

## 2024-07-29 DIAGNOSIS — M54.2 CHRONIC NECK PAIN: ICD-10-CM

## 2024-07-29 PROCEDURE — 97162 PT EVAL MOD COMPLEX 30 MIN: CPT

## 2024-07-29 ASSESSMENT — ENCOUNTER SYMPTOMS
QUALITY: ACHING
PAIN SCALE AT LOWEST: 1
PAIN SCALE: 4
PAIN SCALE AT HIGHEST: 10
POSTURAL HEADACHE: 1
QUALITY: SHARP
PAIN LOCATION: RIGHT SCAPULAR REGION

## 2024-08-12 NOTE — PROGRESS NOTES
"Subjective:   Shira Guzman is a 53 y.o. female who presents for Allergic Reaction (Xtoday, reaction to hair dye, bumpy, weeping, swelling)      Is a pleasant 53-year-old female who has had a recurrent reaction to the hair dye that she uses, dyed her hair today and noted diffuse pruritus, scalp irritation, redness and dryness.  In the past she has used oral steroids that is combat at this    Review of Systems   Constitutional:  Negative for chills and fever.   HENT:  Negative for congestion, ear pain and sore throat.    Eyes:  Negative for pain.   Respiratory:  Negative for cough and shortness of breath.    Cardiovascular:  Negative for chest pain.   Gastrointestinal:  Negative for abdominal pain, constipation, diarrhea, nausea and vomiting.   Genitourinary:  Negative for dysuria.   Musculoskeletal:  Negative for myalgias.   Skin:  Positive for itching and rash.   Neurological:  Negative for headaches.       Medications, Allergies, and current problem list reviewed today in Epic.     Objective:     /70 (BP Location: Left arm, Patient Position: Sitting, BP Cuff Size: Adult)   Pulse 82   Temp 36.6 °C (97.8 °F) (Temporal)   Resp 16   Ht 1.651 m (5' 5\")   Wt 67.1 kg (148 lb)   SpO2 99%     Physical Exam  Vitals reviewed.   Constitutional:       Appearance: Normal appearance.   HENT:      Head: Normocephalic and atraumatic.      Right Ear: External ear normal.      Left Ear: External ear normal.      Nose: Nose normal.      Mouth/Throat:      Mouth: Mucous membranes are moist.   Eyes:      Conjunctiva/sclera: Conjunctivae normal.   Cardiovascular:      Rate and Rhythm: Normal rate.   Pulmonary:      Effort: Pulmonary effort is normal.   Skin:     General: Skin is warm and dry.      Capillary Refill: Capillary refill takes less than 2 seconds.      Findings: Rash (Diffuse rash surrounding scalp with raised erythema some urticarial appearance no weeping or vesicles) present.   Neurological:      Mental " Pt's mom Miriam on [address] called with some concerns as to why pt needs more invasive testing still. She wondered why patient needs treadmill stress test- I advised her the patient informed us of this today and that this test was recommended/ordered by cardiology. She is also concerned of why she needs the MRI/MRA, I advised her neuro ordered and that you discussed with patient getting this scheduled to rule out aneurysm but she is still worried about risk of continued testing & contrast/dye ingestion. Advised that this is your recommendation but she declines scheduling at this time.   Status: She is alert and oriented to person, place, and time.         Assessment/Plan:     Diagnosis and associated orders:     1. Dermatosis of scalp  predniSONE (DELTASONE) 10 MG Tab    dexamethasone (Decadron) injection (check route below) 10 mg    hydrOXYzine HCl (ATARAX) 25 MG Tab    Clobetasol Propionate 0.05 % Shampoo         Comments/MDM:     Recommend patient consider PPD free containing hair dye.  Treated with oral Decadron in clinic, may trial ongoing antihistamines and clobetasol shampoo for supportive care, start prednisone afternoon the following day.  Follow-up as needed         Differential diagnosis, natural history, supportive care, and indications for immediate follow-up discussed.    Advised the patient to follow-up with the primary care physician for recheck, reevaluation, and consideration of further management.    Please note that this dictation was created using voice recognition software. I have made a reasonable attempt to correct obvious errors, but I expect that there are errors of grammar and possibly content that I did not discover before finalizing the note.    This note was electronically signed by Hasmukh Abbott PA-C

## 2024-08-19 ENCOUNTER — APPOINTMENT (OUTPATIENT)
Dept: PHYSICAL THERAPY | Facility: REHABILITATION | Age: 54
End: 2024-08-19
Attending: FAMILY MEDICINE
Payer: COMMERCIAL

## 2024-08-25 RX ORDER — HYDROXYZINE HYDROCHLORIDE 25 MG/1
TABLET, FILM COATED ORAL
Qty: 30 TABLET | Refills: 1 | OUTPATIENT
Start: 2024-08-25

## 2024-08-27 ENCOUNTER — APPOINTMENT (OUTPATIENT)
Dept: NEUROLOGY | Facility: MEDICAL CENTER | Age: 54
End: 2024-08-27
Attending: SPECIALIST
Payer: COMMERCIAL

## 2024-09-06 ENCOUNTER — OFFICE VISIT (OUTPATIENT)
Dept: MEDICAL GROUP | Age: 54
End: 2024-09-06
Payer: COMMERCIAL

## 2024-09-06 VITALS
TEMPERATURE: 97.2 F | SYSTOLIC BLOOD PRESSURE: 110 MMHG | WEIGHT: 145 LBS | DIASTOLIC BLOOD PRESSURE: 66 MMHG | OXYGEN SATURATION: 94 % | HEIGHT: 65 IN | HEART RATE: 72 BPM | BODY MASS INDEX: 24.16 KG/M2

## 2024-09-06 DIAGNOSIS — Z90.13 S/P MASTECTOMY, BILATERAL: ICD-10-CM

## 2024-09-06 DIAGNOSIS — Z17.0 MALIGNANT NEOPLASM OF UPPER-OUTER QUADRANT OF LEFT BREAST IN FEMALE, ESTROGEN RECEPTOR POSITIVE (HCC): ICD-10-CM

## 2024-09-06 DIAGNOSIS — Z76.89 ESTABLISHING CARE WITH NEW DOCTOR, ENCOUNTER FOR: ICD-10-CM

## 2024-09-06 DIAGNOSIS — F41.1 GENERALIZED ANXIETY DISORDER: ICD-10-CM

## 2024-09-06 DIAGNOSIS — Z91.030 ALLERGIC TO BEES: ICD-10-CM

## 2024-09-06 DIAGNOSIS — S16.1XXD CERVICAL MYOFASCIAL STRAIN, SUBSEQUENT ENCOUNTER: ICD-10-CM

## 2024-09-06 DIAGNOSIS — C50.412 MALIGNANT NEOPLASM OF UPPER-OUTER QUADRANT OF LEFT BREAST IN FEMALE, ESTROGEN RECEPTOR POSITIVE (HCC): ICD-10-CM

## 2024-09-06 DIAGNOSIS — E78.5 DYSLIPIDEMIA: ICD-10-CM

## 2024-09-06 PROBLEM — Z86.16 HISTORY OF COVID-19: Status: RESOLVED | Noted: 2021-08-11 | Resolved: 2024-09-06

## 2024-09-06 PROBLEM — C50.912 MALIGNANT NEOPLASM OF LEFT FEMALE BREAST (HCC): Status: RESOLVED | Noted: 2017-06-01 | Resolved: 2024-09-06

## 2024-09-06 PROBLEM — R29.898 DECREASED ROM OF NECK: Status: RESOLVED | Noted: 2018-09-05 | Resolved: 2024-09-06

## 2024-09-06 PROBLEM — M25.50 JOINT PAIN: Status: RESOLVED | Noted: 2017-11-09 | Resolved: 2024-09-06

## 2024-09-06 PROBLEM — Z83.2 FAMILY HISTORY OF AUTOIMMUNE DISORDER: Status: RESOLVED | Noted: 2017-11-09 | Resolved: 2024-09-06

## 2024-09-06 PROCEDURE — 3074F SYST BP LT 130 MM HG: CPT | Performed by: PHYSICIAN ASSISTANT

## 2024-09-06 PROCEDURE — 3078F DIAST BP <80 MM HG: CPT | Performed by: PHYSICIAN ASSISTANT

## 2024-09-06 PROCEDURE — 99215 OFFICE O/P EST HI 40 MIN: CPT | Performed by: PHYSICIAN ASSISTANT

## 2024-09-06 RX ORDER — HYDROXYZINE HYDROCHLORIDE 25 MG/1
25 TABLET, FILM COATED ORAL 3 TIMES DAILY PRN
Qty: 30 TABLET | Refills: 2 | Status: SHIPPED | OUTPATIENT
Start: 2024-09-06

## 2024-09-06 RX ORDER — IBUPROFEN 800 MG/1
800 TABLET, FILM COATED ORAL EVERY 8 HOURS PRN
Qty: 60 TABLET | Refills: 2 | Status: SHIPPED | OUTPATIENT
Start: 2024-09-06

## 2024-09-06 RX ORDER — EPINEPHRINE 0.3 MG/.3ML
INJECTION SUBCUTANEOUS
Qty: 1 EACH | Refills: 3 | Status: SHIPPED | OUTPATIENT
Start: 2024-09-06

## 2024-09-06 ASSESSMENT — PATIENT HEALTH QUESTIONNAIRE - PHQ9
SUM OF ALL RESPONSES TO PHQ9 QUESTIONS 1 AND 2: 0
5. POOR APPETITE OR OVEREATING: SEVERAL DAYS
8. MOVING OR SPEAKING SO SLOWLY THAT OTHER PEOPLE COULD HAVE NOTICED. OR THE OPPOSITE, BEING SO FIGETY OR RESTLESS THAT YOU HAVE BEEN MOVING AROUND A LOT MORE THAN USUAL: NOT AT ALL
7. TROUBLE CONCENTRATING ON THINGS, SUCH AS READING THE NEWSPAPER OR WATCHING TELEVISION: NOT AT ALL
6. FEELING BAD ABOUT YOURSELF - OR THAT YOU ARE A FAILURE OR HAVE LET YOURSELF OR YOUR FAMILY DOWN: NOT AL ALL
2. FEELING DOWN, DEPRESSED, IRRITABLE, OR HOPELESS: NOT AT ALL
3. TROUBLE FALLING OR STAYING ASLEEP OR SLEEPING TOO MUCH: MORE THAN HALF THE DAYS
SUM OF ALL RESPONSES TO PHQ QUESTIONS 1-9: 5
1. LITTLE INTEREST OR PLEASURE IN DOING THINGS: NOT AT ALL
9. THOUGHTS THAT YOU WOULD BE BETTER OFF DEAD, OR OF HURTING YOURSELF: NOT AT ALL
4. FEELING TIRED OR HAVING LITTLE ENERGY: MORE THAN HALF THE DAYS

## 2024-09-06 ASSESSMENT — FIBROSIS 4 INDEX: FIB4 SCORE: 0.97

## 2024-09-06 NOTE — PROGRESS NOTES
cc: Establish care and referral to physical therapy    Subjective:     HPI    History of Present Illness  The patient is a 49-year-old female presenting to establish care.    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 Despite not having an EpiPen, she is open to carrying one due to her outdoor activities, including hiking.    She was diagnosed with breast cancer in 2017, leading to bilateral mastectomy. She was initially on tamoxifen/Zoladex, for 3 years however due to intolerability her switched to Reclast she completed 21 months of adjuvant tamoxifen which was stopped in 2022 due to mood changes she has been off of hormone therapy.  She did have total abdominal hysterectomy.  She is part of the survivorship program at [facility] and sees Dr. Castillo annually.     She has been managing her anxiety without medications as she is very sensitive to these.  She does note that if her insomnia is exacerbated than her anxiety is worse.  She was previously taking hydroxyzine which provided good benefit did not make her groggy.  Has previously tried trazodone and amitriptyline, but did not tolerate these medications.  She would be interested in prescription for hydroxyzine.  She does continue to go to counseling     Since having bilateral mastectomies she does have cervical myofascial pain.  She was referred to physical therapy, but needs new referral.  Over-the-counter ibuprofen is not effective as 800 mg.  She states that if she takes 300 mg ibuprofen can last for couple days with pain relief.    She does have moderately elevated cholesterol.  She recently had lab work done and underwent a CT cardiac score test which was . She exercises three times a week, walking 4 or 5 miles each time.     SOCIAL HISTORY  She is currently on a sabbatical and has gone back to school to take some classes. She ran a construction office.  She has 2 children, a daughter who is 25 and a son  "who is 19.          Review of systems:  See above.       Current Outpatient Medications:     ibuprofen (MOTRIN) 800 MG Tab, Take 1 Tablet by mouth every 8 hours as needed for Moderate Pain., Disp: 60 Tablet, Rfl: 2    EPINEPHrine (EPIPEN) 0.3 MG/0.3ML Solution Auto-injector solution for injection, Inject 0.3 mL into the thigh one time for 1 dose, Disp: 1 Each, Rfl: 3    hydrOXYzine HCl (ATARAX) 25 MG Tab, Take 1 Tablet by mouth 3 times a day as needed for Anxiety., Disp: 30 Tablet, Rfl: 2    Allergies, past medical history, past surgical history, family history, social history reviewed and updated    Objective:     Vitals: /66 (BP Location: Right arm, Patient Position: Sitting, BP Cuff Size: Adult)   Pulse 72   Temp 36.2 °C (97.2 °F) (Temporal)   Ht 1.651 m (5' 5\")   Wt 65.8 kg (145 lb)   LMP 10/28/2015   SpO2 94%   BMI 24.13 kg/m²   General: Alert, pleasant, NAD  HEENT: Normocephalic. Neck supple.  Full range of motion of the neck.  Moderately tender along the right trapezius and cervical paraspinous muscles with spasm.  No thyromegaly or masses palpated. No cervical or supraclavicular lymphadenopathy. No carotid bruits   Heart: Regular rate and rhythm.  S1 and S2 normal.  No murmurs appreciated.  Respiratory: Normal respiratory effort.  Clear to auscultation bilaterally.  Skin: Warm, dry, no rashes.  Extremities: No leg edema.  Radial pulses 2+ symmetric  Psych:  Affect/mood is normal, judgement is good, memory is intact, grooming is appropriate.    Assessment/Plan:     Shira was seen today for establish care and referral needed.    Diagnoses and all orders for this visit:    Cervical myofascial strain, subsequent encounter  -Referral placed to physical therapy.  Continue ibuprofen as needed.  -     Referral to Physical Therapy  -     ibuprofen (MOTRIN) 800 MG Tab; Take 1 Tablet by mouth every 8 hours as needed for Moderate Pain.    S/P mastectomy, bilateral  -See above  -     Referral to Physical " Therapy    Generalized anxiety disorder  -Overall doing well.  Does not feel she needs to be on maintenance medications.  Hydroxyzine is effective for her insomnia or as needed.  Continue with counseling  -     hydrOXYzine HCl (ATARAX) 25 MG Tab; Take 1 Tablet by mouth 3 times a day as needed for Anxiety.    Malignant neoplasm of upper-outer quadrant of left breast in female, estrogen receptor positive (HCC)  Currently followed by [facility].  In remission.    Dyslipidemia  Moderately elevated, however CT cardiac score 0.  Will continue with lifestyle modifications    Allergic to bees  -History of anaphylaxis to bees.  EpiPen sent to the pharmacy  -     EPINEPHrine (EPIPEN) 0.3 MG/0.3ML Solution Auto-injector solution for injection; Inject 0.3 mL into the thigh one time for 1 dose    Establishing care with new doctor, encounter for  Previous records reviewed      My total time spent caring for the patient on the day of the encounter was 40 minutes.   This does not include time spent on separately billable procedures/tests.      Return in about 6 weeks (around 10/18/2024) for Annual PX.

## 2024-10-10 RX ORDER — HYDROXYZINE HYDROCHLORIDE 25 MG/1
TABLET, FILM COATED ORAL
Qty: 30 TABLET | Refills: 1 | OUTPATIENT
Start: 2024-10-10

## 2024-10-21 ENCOUNTER — APPOINTMENT (OUTPATIENT)
Dept: PHYSICAL THERAPY | Facility: REHABILITATION | Age: 54
End: 2024-10-21
Attending: FAMILY MEDICINE
Payer: COMMERCIAL

## 2024-10-28 ENCOUNTER — APPOINTMENT (OUTPATIENT)
Dept: PHYSICAL THERAPY | Facility: REHABILITATION | Age: 54
End: 2024-10-28
Attending: FAMILY MEDICINE
Payer: COMMERCIAL

## 2024-11-05 ENCOUNTER — OFFICE VISIT (OUTPATIENT)
Dept: MEDICAL GROUP | Age: 54
End: 2024-11-05
Payer: COMMERCIAL

## 2024-11-05 VITALS
WEIGHT: 148 LBS | HEIGHT: 65 IN | BODY MASS INDEX: 24.66 KG/M2 | OXYGEN SATURATION: 95 % | DIASTOLIC BLOOD PRESSURE: 60 MMHG | TEMPERATURE: 98 F | SYSTOLIC BLOOD PRESSURE: 110 MMHG | HEART RATE: 73 BPM

## 2024-11-05 DIAGNOSIS — Z17.0 MALIGNANT NEOPLASM OF UPPER-OUTER QUADRANT OF LEFT BREAST IN FEMALE, ESTROGEN RECEPTOR POSITIVE (HCC): ICD-10-CM

## 2024-11-05 DIAGNOSIS — Z90.13 S/P MASTECTOMY, BILATERAL: ICD-10-CM

## 2024-11-05 DIAGNOSIS — C50.412 MALIGNANT NEOPLASM OF UPPER-OUTER QUADRANT OF LEFT BREAST IN FEMALE, ESTROGEN RECEPTOR POSITIVE (HCC): ICD-10-CM

## 2024-11-05 DIAGNOSIS — N64.4 CHRONIC BREAST PAIN: ICD-10-CM

## 2024-11-05 DIAGNOSIS — G89.29 CHRONIC BREAST PAIN: ICD-10-CM

## 2024-11-05 DIAGNOSIS — Z00.00 WELL ADULT EXAM: ICD-10-CM

## 2024-11-05 DIAGNOSIS — F41.1 GENERALIZED ANXIETY DISORDER: ICD-10-CM

## 2024-11-05 PROCEDURE — 3074F SYST BP LT 130 MM HG: CPT | Performed by: PHYSICIAN ASSISTANT

## 2024-11-05 PROCEDURE — 99396 PREV VISIT EST AGE 40-64: CPT | Performed by: PHYSICIAN ASSISTANT

## 2024-11-05 PROCEDURE — 99214 OFFICE O/P EST MOD 30 MIN: CPT | Mod: 25 | Performed by: PHYSICIAN ASSISTANT

## 2024-11-05 PROCEDURE — 3078F DIAST BP <80 MM HG: CPT | Performed by: PHYSICIAN ASSISTANT

## 2024-11-05 ASSESSMENT — FIBROSIS 4 INDEX: FIB4 SCORE: 0.97

## 2024-11-05 NOTE — PROGRESS NOTES
Subjective:     CC:   Chief Complaint   Patient presents with    Annual Exam    Breast Pain       HPI:   Shira Guzman is a 53 y.o. female who presents for annual exam    History of Present Illness  She also has acute concerns she would like to discuss today.    She has been experiencing discomfort in her right breast for the past 2 to 3 months.  She has a history of bilateral mastectomies with breast implants.  The discomfort is more pronounced at night and is exacerbated by physical activity, such as exercise. She is unable to perform push-ups or swim due to the pain, which she describes as radiating from the center to the side of her breast. She reports no numbness, tingling, discoloration, redness, warmth, or swelling in the area. She is particularly concerned about sharp pain originating from the nipple, which was not removed during her mastectomy. She recalls having capsular contracture with her first implant, which was replaced within 3 months.        Patient has GYN provider: No   Last Pap Smear: 2022-history of hysterectomy  Last Mammogram: History of bilateral mastectomies  Last Colorectal Cancer Screenin2022  Last Tdap: 2016  Received HPV series: Aged out    Exercise: strenuous regular exercise, aerobic > 3 hours a week  Diet: Well rounded      Patient's last menstrual period was 10/28/2015.  She has not utilized hormone replacement therapy.  No nipple discharge or changes in size or contour.    OB History    Para Term  AB Living   2 2 0 0 0 2   SAB IAB Ectopic Molar Multiple Live Births   0 0 0 0 0 2      She  reports being sexually active and has had partner(s) who are male.    She  has a past medical history of Anxiety, Asthma, Cancer (HCC), Family history of autoimmune disorder (2017), and PCOS (polycystic ovarian syndrome).  She  has a past surgical history that includes ovarian cystectomy (@ 19 yo); knee manipulation (retracking of knee cap); mastectomy modified  radical; and hysterectomy, total abdominal.    Family History   Problem Relation Age of Onset    Arthritis Mother         psoriatic.    Heart Disease Mother     Diabetes Mother     Genetic Disorder Mother         psoriatric arthritis    Hypertension Father     Hyperlipidemia Father     No Known Problems Sister     Other Daughter         pcos    No Known Problems Son      Social History     Tobacco Use    Smoking status: Former     Current packs/day: 0.00     Average packs/day: 0.1 packs/day for 1 year (0.1 ttl pk-yrs)     Types: Cigarettes     Start date: 1/3/2013     Quit date: 1/3/2014     Years since quitting: 10.8    Smokeless tobacco: Never    Tobacco comments:     2 cig / day   Vaping Use    Vaping status: Never Used   Substance Use Topics    Alcohol use: No     Alcohol/week: 0.0 oz    Drug use: No       Patient Active Problem List    Diagnosis Date Noted    Dyslipidemia 09/06/2024    Aromatase inhibitor use 03/06/2020    Rosacea 12/20/2018    Current moderate episode of major depressive disorder without prior episode ([facility]) 05/29/2018    S/P mastectomy, bilateral 10/16/2017    Malignant neoplasm of upper-outer quadrant of left breast in female, estrogen receptor positive ([facility]) 05/11/2017    Cervical myofascial strain 10/01/2015    Generalized anxiety disorder 06/03/2015     Current Outpatient Medications   Medication Sig Dispense Refill    ibuprofen (MOTRIN) 800 MG Tab Take 1 Tablet by mouth every 8 hours as needed for Moderate Pain. 60 Tablet 2    EPINEPHrine (EPIPEN) 0.3 MG/0.3ML Solution Auto-injector solution for injection Inject 0.3 mL into the thigh one time for 1 dose 1 Each 3    hydrOXYzine HCl (ATARAX) 25 MG Tab Take 1 Tablet by mouth 3 times a day as needed for Anxiety. 30 Tablet 2     No current facility-administered medications for this visit.     Allergies   Allergen Reactions    Bee Venom Anaphylaxis    Latex Hives    Cillins [Penicillins] Unspecified     Abdominal pain    Codeine Nausea and  "Unspecified       Review of Systems   Constitutional: Negative for fever, chills and malaise/fatigue.   HENT: Negative for congestion.    Eyes: Negative for pain.   Respiratory: Negative for cough and shortness of breath.    Cardiovascular: Negative for chest pain and leg swelling.   Gastrointestinal: Negative for nausea, vomiting, abdominal pain and diarrhea.   Genitourinary: Negative for dysuria and hematuria.   Skin: Negative for rash.   Neurological: Negative for dizziness, focal weakness and headaches.   Endo/Heme/Allergies: Does not bruise/bleed easily.   Psychiatric/Behavioral: Negative for depression.  The patient is not nervous/anxious.      Objective:   /60 (BP Location: Left arm, Patient Position: Sitting, BP Cuff Size: Adult)   Pulse 73   Temp 36.7 °C (98 °F) (Temporal)   Ht 1.651 m (5' 5\")   Wt 67.1 kg (148 lb)   LMP 10/28/2015   SpO2 95%   BMI 24.63 kg/m²     Wt Readings from Last 4 Encounters:   11/05/24 67.1 kg (148 lb)   09/06/24 65.8 kg (145 lb)   06/04/24 67.6 kg (149 lb)   05/10/24 66.7 kg (147 lb)         Physical Exam:  Constitutional: Well-developed and well-nourished. Not diaphoretic. No distress.   Skin: Skin is warm and dry. No rash noted.  Head: Atraumatic without lesions.  Eyes: Conjunctivae and extraocular motions are normal. Pupils are equal, round, and reactive to light. No scleral icterus.   Ears:  External ears unremarkable. Tympanic membranes clear and intact.  Mouth/Throat: Tongue normal. Oropharynx is clear and moist. Posterior pharynx without erythema or exudates.  Neck: Supple, trachea midline. Normal range of motion. No thyromegaly present. No lymphadenopathy--cervical or supraclavicular.  Cardiovascular: Regular rate and rhythm, S1 and S2 without murmur, rubs, or gallops.    Respiratory: Effort normal. Clear to auscultation throughout. No adventitious sounds.   Breast: Breasts examined supine. No skin changes, peau d'orange or nipple retraction. No discharge. No " axillary or supraclavicular adenopathy. No masses or nodularity palpable.  She is mildly tender in the right upper outer quadrant at the base of the implant on the right breast, also mildly tender in the left upper outer quadrant of the left breast.  Abdomen: Soft, non tender, and without distention. Active bowel sounds in all four quadrants. No rebound, guarding, masses or HSM.  Extremities: No cyanosis, clubbing, erythema, nor edema. Distal pulses intact and symmetric.   Musculoskeletal: All major joints AROM full in all directions without pain.  Neurological: Alert and oriented x 3. Grossly non-focal. Strength and sensation grossly intact. DTRs 2+/3 and symmetric.   Psychiatric:  Behavior, mood, and affect are appropriate.    Assessment and Plan:     1. Well adult exam  Continue with regular physical activity as tolerated and good control diet.  Declines immunizations today.    2. Malignant neoplasm of upper-outer quadrant of left breast in female, estrogen receptor positive (HCC)  -Currently in remission.  Followed by survivorship program at [facility].  - US-BREAST BILAT-COMPLETE; Future    3. Chronic breast pain  -New concern.  She has been having pain in her right breast for the past 2 to 3 months, also intermittent pain in her left breast.  She is tender on exam.  No signs of acute infection.  No palpable masses. However with history of breast cancer, mastectomy.  Will obtain bilateral ultrasound.  Referral placed to PT.  Did advise to touch base with her survivorship program through [facility].  -BREAST BILAT-COMPLETE; Future  - Referral to Physical Therapy    4. S/P mastectomy, bilateral  -See #3   US-BREAST BILAT-COMPLETE; Future  - Referral to Physical Therapy    5. Generalized anxiety disorder  Stable.  Continue Atarax as needed    Health maintenance:     Labs per orders  Immunizations per orders  Patient counseled about skin care, diet, supplements, and exercise.  Discussed  breast self exam, mammography  screening, adequate intake of calcium and vitamin D, colorectal cancer screening     Follow-up: Return in about 1 year (around 11/5/2025) for Annual PX.

## 2024-11-13 ENCOUNTER — HOSPITAL ENCOUNTER (OUTPATIENT)
Dept: RADIOLOGY | Facility: MEDICAL CENTER | Age: 54
End: 2024-11-13
Payer: COMMERCIAL

## 2024-12-16 ENCOUNTER — HOSPITAL ENCOUNTER (OUTPATIENT)
Dept: RADIOLOGY | Facility: MEDICAL CENTER | Age: 54
End: 2024-12-16
Attending: PHYSICIAN ASSISTANT
Payer: COMMERCIAL

## 2024-12-16 DIAGNOSIS — G89.29 CHRONIC BREAST PAIN: ICD-10-CM

## 2024-12-16 DIAGNOSIS — Z17.0 MALIGNANT NEOPLASM OF UPPER-OUTER QUADRANT OF LEFT BREAST IN FEMALE, ESTROGEN RECEPTOR POSITIVE (HCC): ICD-10-CM

## 2024-12-16 DIAGNOSIS — N64.4 CHRONIC BREAST PAIN: ICD-10-CM

## 2024-12-16 DIAGNOSIS — C50.412 MALIGNANT NEOPLASM OF UPPER-OUTER QUADRANT OF LEFT BREAST IN FEMALE, ESTROGEN RECEPTOR POSITIVE (HCC): ICD-10-CM

## 2024-12-16 DIAGNOSIS — Z90.13 S/P MASTECTOMY, BILATERAL: ICD-10-CM

## 2024-12-16 PROCEDURE — 76642 ULTRASOUND BREAST LIMITED: CPT | Mod: RT

## 2024-12-30 DIAGNOSIS — F41.1 GENERALIZED ANXIETY DISORDER: ICD-10-CM

## 2024-12-30 DIAGNOSIS — S16.1XXD CERVICAL MYOFASCIAL STRAIN, SUBSEQUENT ENCOUNTER: ICD-10-CM

## 2024-12-30 RX ORDER — IBUPROFEN 800 MG/1
800 TABLET, FILM COATED ORAL EVERY 8 HOURS PRN
Qty: 60 TABLET | Refills: 2 | Status: SHIPPED | OUTPATIENT
Start: 2024-12-30

## 2024-12-30 RX ORDER — HYDROXYZINE HYDROCHLORIDE 25 MG/1
25 TABLET, FILM COATED ORAL 3 TIMES DAILY PRN
Qty: 30 TABLET | Refills: 2 | Status: SHIPPED | OUTPATIENT
Start: 2024-12-30

## 2025-02-11 ENCOUNTER — OFFICE VISIT (OUTPATIENT)
Dept: MEDICAL GROUP | Age: 55
End: 2025-02-11
Payer: COMMERCIAL

## 2025-02-11 ENCOUNTER — TELEPHONE (OUTPATIENT)
Dept: CARDIOLOGY | Facility: MEDICAL CENTER | Age: 55
End: 2025-02-11

## 2025-02-11 VITALS
HEART RATE: 88 BPM | OXYGEN SATURATION: 99 % | HEIGHT: 65 IN | SYSTOLIC BLOOD PRESSURE: 120 MMHG | DIASTOLIC BLOOD PRESSURE: 64 MMHG | BODY MASS INDEX: 24.16 KG/M2 | WEIGHT: 145 LBS | TEMPERATURE: 97.6 F

## 2025-02-11 DIAGNOSIS — C50.912 CARCINOMA OF LEFT BREAST METASTATIC TO MULTIPLE SITES (HCC): ICD-10-CM

## 2025-02-11 DIAGNOSIS — Z79.899 HIGH RISK MEDICATION USE: ICD-10-CM

## 2025-02-11 PROCEDURE — 3074F SYST BP LT 130 MM HG: CPT | Performed by: PHYSICIAN ASSISTANT

## 2025-02-11 PROCEDURE — 3078F DIAST BP <80 MM HG: CPT | Performed by: PHYSICIAN ASSISTANT

## 2025-02-11 PROCEDURE — 99214 OFFICE O/P EST MOD 30 MIN: CPT | Performed by: PHYSICIAN ASSISTANT

## 2025-02-11 RX ORDER — TAPINAROF 10 MG/1000MG
CREAM TOPICAL
COMMUNITY
Start: 2025-01-28

## 2025-02-11 RX ORDER — LORAZEPAM 1 MG/1
1 TABLET ORAL
COMMUNITY
Start: 2024-12-27

## 2025-02-11 RX ORDER — DEXTROMETHORPHAN HBR. AND GUAIFENESIN 10; 100 MG/5ML; MG/5ML
5 SOLUTION ORAL
COMMUNITY
Start: 2025-01-28 | End: 2025-02-11

## 2025-02-11 RX ORDER — ANASTROZOLE 1 MG/1
1 TABLET ORAL
COMMUNITY
Start: 2025-01-08

## 2025-02-11 RX ORDER — HYDROCODONE BITARTRATE AND ACETAMINOPHEN 5; 325 MG/1; MG/1
1 TABLET ORAL EVERY 6 HOURS PRN
COMMUNITY
Start: 2025-01-23

## 2025-02-11 RX ORDER — BENZONATATE 100 MG/1
100 CAPSULE ORAL
COMMUNITY
Start: 2025-01-28 | End: 2025-02-11

## 2025-02-11 RX ORDER — GABAPENTIN 300 MG/1
600 CAPSULE ORAL
COMMUNITY
Start: 2024-12-27 | End: 2025-02-11

## 2025-02-11 RX ORDER — CYCLOBENZAPRINE HCL 5 MG
5 TABLET ORAL
COMMUNITY
Start: 2025-01-23

## 2025-02-11 RX ORDER — GABAPENTIN 100 MG/1
CAPSULE ORAL
COMMUNITY
Start: 2024-11-21 | End: 2025-02-11

## 2025-02-11 ASSESSMENT — PATIENT HEALTH QUESTIONNAIRE - PHQ9: CLINICAL INTERPRETATION OF PHQ2 SCORE: 0

## 2025-02-11 ASSESSMENT — FIBROSIS 4 INDEX: FIB4 SCORE: 0.99

## 2025-02-11 NOTE — TELEPHONE ENCOUNTER
ADD          Caller: Jaja with [facility]    Topic/issue: They were calling on Behalf of this patient to see if [facility] could send in a order for the patient to complete and EKG appt with us every 4 weeks due to the patients breast cancer and if heart and vascular would approve. Please advise      Callback Number: see below      Thank you    -Phong CORRALES

## 2025-02-11 NOTE — PROGRESS NOTES
cc: Requesting EKG    Subjective:     HPI    History of Present Illness  The patient is a 54-year-old female presenting for a follow-up on metastatic breast cancer.    She reports that her breast cancer has metastasized, causing pain due to tumors in the pleura impacting nerves. She underwent several scans in December 2024, which revealed signs of metastasis. A subsequent biopsy confirmed the presence of the same breast cancer in the pleura of her lungs. She is currently on Arimidex and started on Kisqali for the metastasis. She started anastrozole at the end of December 2024 without any complications.    She is currently followed by oncology at [facility].  Due to the potential side effects of Kisqali, she requires regular EKGs.  She states that her oncologist can order standing EKGs she just needs to know where she can get them done.  She needs to have her first EKG completed 2/20.  She does not want to be driving back and forth to [facility] for just the EKGs.  She is considering establishing with oncology here locally as well.  She is generally in good spirits but expresses concern about the potential side effects of Kisqali.          Review of systems:  See above.       Current Outpatient Medications:     anastrozole (ARIMIDEX) 1 MG Tab, Take 1 mg by mouth., Disp: , Rfl:     cyclobenzaprine (FLEXERIL) 5 mg tablet, Take 5 mg by mouth., Disp: , Rfl:     HYDROcodone-acetaminophen (NORCO) 5-325 MG Tab per tablet, Take 1 Tablet by mouth every 6 hours as needed., Disp: , Rfl:     LORazepam (ATIVAN) 1 MG Tab, Take 1 mg by mouth., Disp: , Rfl:     Naloxone (NARCAN) 4 MG/0.1ML Liquid, Administer 1 Spray into affected nostril(S)., Disp: , Rfl:     Ribociclib Succ, 600 MG Dose, 200 MG Tablet Therapy Pack, Take 600 mg by mouth., Disp: , Rfl:     VTAMA 1 % Cream, , Disp: , Rfl:     ibuprofen (MOTRIN) 800 MG Tab, Take 1 Tablet by mouth every 8 hours as needed for Moderate Pain., Disp: 60 Tablet, Rfl: 2    hydrOXYzine HCl (ATARAX) 25 MG  "Tab, Take 1 Tablet by mouth 3 times a day as needed for Anxiety., Disp: 30 Tablet, Rfl: 2    EPINEPHrine (EPIPEN) 0.3 MG/0.3ML Solution Auto-injector solution for injection, Inject 0.3 mL into the thigh one time for 1 dose, Disp: 1 Each, Rfl: 3    Allergies, past medical history, past surgical history, family history, social history reviewed and updated    Objective:     Vitals: /64 (BP Location: Left arm, Patient Position: Sitting, BP Cuff Size: Adult)   Pulse 88   Temp 36.4 °C (97.6 °F) (Temporal)   Ht 1.651 m (5' 5\")   Wt 65.8 kg (145 lb)   LMP 10/28/2015   SpO2 99%   BMI 24.13 kg/m²   General: Alert, pleasant, NAD  HEENT: Normocephalic. Neck supple. No carotid bruits   Heart: Regular rate and rhythm.  S1 and S2 normal.  No murmurs appreciated.  Respiratory: Normal respiratory effort.  Clear to auscultation bilaterally.  Skin: Warm, dry, no rashes.  Psych:  Affect/mood is normal, judgement is good, memory is intact, grooming is appropriate.    Assessment/Plan:     Shira was seen today for breast cancer and orders needed.    Diagnoses and all orders for this visit:    Carcinoma of left breast metastatic to multiple sites (HCC)  Currently managed by oncology at [facility].  Started on Arimidex and Kisqali.  Due potential side effects of Kisqali will need monthly EKGs.  Did reach out to our cardiology department/imaging.  They are able to accept standing orders for the EKGs.  However, we can do her first EKG that is due next week in the clinic.  She is placed on the MA schedule.    High risk medication use  -     EKG - Clinic Performed; Future        Return in about 1 year (around 2/11/2026).  "

## 2025-02-11 NOTE — TELEPHONE ENCOUNTER
Attempted to call Jaja back at [facility]. There was no answer. Left a VM for a call back.

## 2025-02-12 NOTE — TELEPHONE ENCOUNTER
Good Morning Vero- Yes.That is fine. Patient can do walk in EKG appointment as needed for her EKGs. If she can bring the order with her that is best.

## 2025-02-20 ENCOUNTER — NON-PROVIDER VISIT (OUTPATIENT)
Dept: MEDICAL GROUP | Age: 55
End: 2025-02-20
Payer: COMMERCIAL

## 2025-02-20 DIAGNOSIS — Z79.899 HIGH RISK MEDICATION USE: ICD-10-CM

## 2025-02-20 PROCEDURE — 99999 PR NO CHARGE: CPT | Performed by: PHYSICIAN ASSISTANT

## 2025-02-20 NOTE — PROGRESS NOTES
Shira Tamkaylee Guzman is a 54 y.o. female here for a non-provider visit for EKG    If abnormal was an in office provider notified today (if so, indicate provider)? Yes    Routed to PCP? Yes

## 2025-04-01 ENCOUNTER — TELEPHONE (OUTPATIENT)
Dept: PHYSICAL THERAPY | Facility: REHABILITATION | Age: 55
End: 2025-04-01
Payer: COMMERCIAL

## 2025-04-01 NOTE — OP THERAPY DISCHARGE SUMMARY
Outpatient Physical Therapy  DISCHARGE SUMMARY NOTE      [facility] [address], [address]  [place] [address]  Phone:  [phone]    Date of Visit: 04/01/2025    Patient: Shira Guzman  YOB: 1970  MRN: [id number]     Referring Provider: Grant Mistry M.D.    Referring Diagnosis Chronic neck pain +1          Functional Assessment Used        Your patient is being discharged from Physical Therapy with the following comments:   Patient has failed to schedule or reschedule follow-up visits    Comments:       Limitations Remaining:      Recommendations:  Discharge from PT    Kalli Uribe PT, MSPT    Date: 4/1/2025